# Patient Record
Sex: MALE
[De-identification: names, ages, dates, MRNs, and addresses within clinical notes are randomized per-mention and may not be internally consistent; named-entity substitution may affect disease eponyms.]

---

## 2017-12-26 ENCOUNTER — HOSPITAL ENCOUNTER (EMERGENCY)
Dept: HOSPITAL 14 - H.ER | Age: 37
Discharge: HOME | End: 2017-12-26
Payer: COMMERCIAL

## 2017-12-26 VITALS
DIASTOLIC BLOOD PRESSURE: 92 MMHG | OXYGEN SATURATION: 97 % | RESPIRATION RATE: 16 BRPM | HEART RATE: 83 BPM | TEMPERATURE: 99 F | SYSTOLIC BLOOD PRESSURE: 150 MMHG

## 2017-12-26 DIAGNOSIS — L03.211: Primary | ICD-10-CM

## 2017-12-26 NOTE — ED PDOC
HPI: General Adult


Time Seen by Provider: 12/26/17 07:57


History Per: Patient


Onset/Duration Of Symptoms: Days (2)


Current Symptoms Are (Timing): Still Present


Severity: Moderate


Pain Scale Rating Of: 1


Additional Complaint(s): 





Swelling lower lip since Saturday. No fever. No rash. No SOB. No tightness in 

throat.





Past Medical History





- Medical History


PMH: Asthma





- Family History


Family History: States: Unknown Family Hx





- Home Medications


Home Medications: 


 Ambulatory Orders











 Medication  Instructions  Recorded


 


Clindamycin [Cleocin] 300 mg PO TID #30 cap 12/26/17


 


Valacyclovir HCl [Valtrex] 1 gm PO Q8 #30 tablet 12/26/17


 


traMADol [Ultram] 50 mg PO Q8 #10 tab 12/26/17














- Allergies


Allergies/Adverse Reactions: 


 Allergies











Allergy/AdvReac Type Severity Reaction Status Date / Time


 


naproxen [From Naprosyn] AdvReac   Verified 12/26/17 08:09














Review of Systems


Constitutional: Negative for: Fever


ENT: Positive for: Mouth Pain, Mouth Swelling.  Negative for: Throat Swelling


Respiratory: Negative for: Shortness of Breath, Wheezing


Skin: Negative for: Rash





Physical Exam





- Physical Exam


Appears: Positive for: Non-toxic, No Acute Distress


Skin: Positive for: Normal Color, Warm.  Negative for: Rash


ENT: Positive for: Other (Mouth, lower lip swelling and induration. No 

fluctuance. No drainage. No submandibular swelling or tenderness. No trismus or 

stridor.).  Negative for: Pharyngeal Erythema, Tonsillar Exudate, Tonsillar 

Swelling





Disposition





- Clinical Impression


Clinical Impression: 


 Infection of lip








- Patient ED Disposition


Is Patient to be Admitted: No





- Disposition


Referrals: 


[facility] [Outside]


Behin,Babak, MD [Staff Provider] - 


Disposition: Routine/Home


Disposition Time: 08:13


Condition: FAIR


Prescriptions: 


Clindamycin [Cleocin] 300 mg PO TID #30 cap


traMADol [Ultram] 50 mg PO Q8 #10 tab


Valacyclovir HCl [Valtrex] 1 gm PO Q8 #30 tablet


Instructions:  Cellulitis (ED)

## 2017-12-28 ENCOUNTER — HOSPITAL ENCOUNTER (INPATIENT)
Dept: HOSPITAL 14 - H.ER | Age: 37
LOS: 8 days | Discharge: HOME | DRG: 137 | End: 2018-01-05
Attending: INTERNAL MEDICINE | Admitting: INTERNAL MEDICINE
Payer: COMMERCIAL

## 2017-12-28 DIAGNOSIS — B95.62: ICD-10-CM

## 2017-12-28 DIAGNOSIS — N17.0: ICD-10-CM

## 2017-12-28 DIAGNOSIS — E66.9: ICD-10-CM

## 2017-12-28 DIAGNOSIS — T36.8X5A: ICD-10-CM

## 2017-12-28 DIAGNOSIS — R74.0: ICD-10-CM

## 2017-12-28 DIAGNOSIS — R79.89: ICD-10-CM

## 2017-12-28 DIAGNOSIS — K52.1: ICD-10-CM

## 2017-12-28 DIAGNOSIS — K13.0: Primary | ICD-10-CM

## 2017-12-28 LAB
ALBUMIN SERPL-MCNC: 4.4 G/DL (ref 3.5–5)
ALBUMIN/GLOB SERPL: 1.1 {RATIO} (ref 1–2.1)
ALT SERPL-CCNC: 254 U/L (ref 21–72)
AST SERPL-CCNC: 147 U/L (ref 17–59)
BASE EXCESS BLDV CALC-SCNC: 5.9 MMOL/L (ref 0–2)
BASOPHILS # BLD AUTO: 0.1 K/UL (ref 0–0.2)
BASOPHILS NFR BLD: 0.6 % (ref 0–2)
BUN SERPL-MCNC: 6 MG/DL (ref 9–20)
CALCIUM SERPL-MCNC: 9.1 MG/DL (ref 8.4–10.2)
EOSINOPHIL # BLD AUTO: 0.2 K/UL (ref 0–0.7)
EOSINOPHIL NFR BLD: 1.3 % (ref 0–4)
ERYTHROCYTE [DISTWIDTH] IN BLOOD BY AUTOMATED COUNT: 13.8 % (ref 11.5–14.5)
GFR NON-AFRICAN AMERICAN: > 60
HGB BLD-MCNC: 14.5 G/DL (ref 12–18)
LYMPHOCYTES # BLD AUTO: 3.2 K/UL (ref 1–4.3)
LYMPHOCYTES NFR BLD AUTO: 18.8 % (ref 20–40)
MCH RBC QN AUTO: 26.5 PG (ref 27–31)
MCHC RBC AUTO-ENTMCNC: 32.9 G/DL (ref 33–37)
MCV RBC AUTO: 80.8 FL (ref 80–94)
MONOCYTES # BLD: 1.1 K/UL (ref 0–0.8)
MONOCYTES NFR BLD: 6.5 % (ref 0–10)
NEUTROPHILS # BLD: 12.3 K/UL (ref 1.8–7)
NEUTROPHILS NFR BLD AUTO: 72.8 % (ref 50–75)
NRBC BLD AUTO-RTO: 0 % (ref 0–0)
PCO2 BLDV: 54 MMHG (ref 40–60)
PH BLDV: 7.39 [PH] (ref 7.32–7.43)
PLATELET # BLD: 243 K/UL (ref 130–400)
PMV BLD AUTO: 9.6 FL (ref 7.2–11.7)
RBC # BLD AUTO: 5.47 MIL/UL (ref 4.4–5.9)
VENOUS BLOOD FIO2: 21 %
VENOUS BLOOD GAS PO2: 19 MM/HG (ref 30–55)
WBC # BLD AUTO: 16.9 K/UL (ref 4.8–10.8)

## 2017-12-28 RX ADMIN — VITAMIN A AND VITAMIN D PRN EA: 929.3 OINTMENT TOPICAL at 21:12

## 2017-12-28 RX ADMIN — WATER SCH MLS/HR: 1 INJECTION INTRAMUSCULAR; INTRAVENOUS; SUBCUTANEOUS at 21:07

## 2017-12-28 NOTE — ED PDOC
HPI: General Adult


Time Seen by Provider: 12/28/17 12:11


Chief Complaint (Nursing): Abnormal Skin Integrity


History Per: Patient


Additional Complaint(s): 





Pt. states on Saturday he "popped a pimple" on the bottom of his lower lip. On 

Tuesday he developed swelling to the lip and was seen in [facility] ED and prescribed 

Clindamycin which he started the same day which has not provided any relief. 

This morning swelling and pain became worse. Denies fever, trauma, hx of DM. 





Past Medical History


Reviewed: Historical Data, Nursing Documentation, Vital Signs


Vital Signs: 


 Last Vital Signs











Temp  98.7 F   12/28/17 15:54


 


Pulse  67   12/28/17 15:54


 


Resp  18   12/28/17 15:54


 


BP  169/97 H  12/28/17 15:54


 


Pulse Ox  98   12/28/17 18:59














- Family History


Family History: States: No Known Family Hx





- Home Medications


Home Medications: 


 Ambulatory Orders











 Medication  Instructions  Recorded


 


Valacyclovir HCl [Valtrex] 1 gm PO Q4 12/28/17


 


traMADol [Ultram] 50 mg PO Q6 PRN 12/28/17














- Allergies


Allergies/Adverse Reactions: 


 Allergies











Allergy/AdvReac Type Severity Reaction Status Date / Time


 


naproxen [From Naprosyn] AdvReac   Verified 12/26/17 08:09














Review of Systems


ROS Statement: Except As Marked, All Systems Reviewed And Found Negative





Physical Exam





- Physical Exam


Appears: Positive for: Well, Non-toxic, No Acute Distress


Skin: Positive for: Normal Color, Warm.  Negative for: Rash


Eye Exam: Positive for: Normal appearance


ENT: Positive for: Other (large swelling noted to entire lower lip greatest on 

R side with fluctuance )


Neck: Positive for: Normal, Painless ROM


Cardiovascular/Chest: Positive for: Regular Rate, Rhythm


Respiratory: Positive for: CNT, Normal Breath Sounds


Gastrointestinal/Abdominal: Positive for: Normal Exam, Soft.  Negative for: 

Tenderness


Extremity: Positive for: Normal ROM


Neurologic/Psych: Positive for: Alert, Oriented





- Laboratory Results


Result Diagrams: 


 12/28/17 13:54





 12/28/17 13:54





- ECG


O2 Sat by Pulse Oximetry: 98





- Progress


ED Course And Treament: 





Labs ordered. Zosyn IV, Vancomycin IV ordered. Blood culture x 2 ordered. 


Pt. evaluated by Dr. Chung in ED.


Case d/w Dr. Lewis, plastic surgeon, who see patient in hospital.


Arrangements made for 23 hr observation with Dr. Teixeira. 





Disposition





- Clinical Impression


Clinical Impression: 


 Abscess of lip








- Patient ED Disposition


Is Patient to be Admitted: Yes





- Disposition


Disposition Time: 12:44


Condition: STABLE

## 2017-12-29 LAB
ALBUMIN SERPL-MCNC: 4.2 G/DL (ref 3.5–5)
ALBUMIN/GLOB SERPL: 1.1 {RATIO} (ref 1–2.1)
ALT SERPL-CCNC: 238 U/L (ref 21–72)
APTT BLD: 35.2 SECONDS (ref 25.6–37.1)
AST SERPL-CCNC: 119 U/L (ref 17–59)
BUN SERPL-MCNC: 7 MG/DL (ref 9–20)
CALCIUM SERPL-MCNC: 9.1 MG/DL (ref 8.4–10.2)
ERYTHROCYTE [DISTWIDTH] IN BLOOD BY AUTOMATED COUNT: 13.8 % (ref 11.5–14.5)
GFR NON-AFRICAN AMERICAN: > 60
HEPATITIS A IGM: NEGATIVE
HEPATITIS B SURFACE AG: NEGATIVE
HGB BLD-MCNC: 14.4 G/DL (ref 12–18)
INR PPP: 1.3 (ref 0.9–1.2)
MCH RBC QN AUTO: 26.5 PG (ref 27–31)
MCHC RBC AUTO-ENTMCNC: 32.6 G/DL (ref 33–37)
MCV RBC AUTO: 81.2 FL (ref 80–94)
PLATELET # BLD: 238 K/UL (ref 130–400)
PROTHROMBIN TIME: 14.1 SECONDS (ref 9.8–13.1)
RBC # BLD AUTO: 5.44 MIL/UL (ref 4.4–5.9)
WBC # BLD AUTO: 14.3 K/UL (ref 4.8–10.8)

## 2017-12-29 PROCEDURE — 0C91XZZ DRAINAGE OF LOWER LIP, EXTERNAL APPROACH: ICD-10-PCS | Performed by: SURGERY

## 2017-12-29 RX ADMIN — ENOXAPARIN SODIUM SCH: 40 INJECTION SUBCUTANEOUS at 18:32

## 2017-12-29 RX ADMIN — WATER SCH MLS/HR: 1 INJECTION INTRAMUSCULAR; INTRAVENOUS; SUBCUTANEOUS at 22:50

## 2017-12-29 RX ADMIN — WATER SCH MLS/HR: 1 INJECTION INTRAMUSCULAR; INTRAVENOUS; SUBCUTANEOUS at 18:29

## 2017-12-29 RX ADMIN — VITAMIN A AND VITAMIN D PRN EA: 929.3 OINTMENT TOPICAL at 09:16

## 2017-12-29 RX ADMIN — WATER SCH MLS/HR: 1 INJECTION INTRAMUSCULAR; INTRAVENOUS; SUBCUTANEOUS at 04:11

## 2017-12-29 RX ADMIN — WATER SCH MLS/HR: 1 INJECTION INTRAMUSCULAR; INTRAVENOUS; SUBCUTANEOUS at 09:20

## 2017-12-29 NOTE — CP.PCM.CON
History of Present Illness





- History of Present Illness


History of Present Illness: 





"popped a pimple" on the bottom of his lower lip. On Tuesday he developed 

swelling to the lip and was seen in [facility] ED and prescribed Clindamycin which he 

started the same day which has not provided any relief. This morning swelling 

and pain became worse. Denies fever, trauma, hx of DM. 





swelling worse despite  iv antibiotics








- Home Medications


Home Medications: 


 Ambulatory Orders











 Medication  Instructions  Recorded


 


Valacyclovir HCl [Valtrex] 1 gm PO Q4 12/28/17


 


traMADol [Ultram] 50 mg PO Q6 PRN 12/28/17

















Review of Systems





- Constitutional


Constitutional: Anorexia, Chills, Fever, Malaise





- EENT


Eyes: absent: As Per HPI, Blind Spots, Blurred Vision, Change in Vision, 

Decreased Night Vision, Diplopia, Discharge, Dry Eye, Exophthalmos, Floaters, 

Irritation, Itchy Eyes, Loss of Peripheral Vision, Pain, Photophobia, Requires 

Corrective Lenses, Sees Flashes, Spots in Vision, Tunnel Vision, Other Visual 

Disturbances, Loss of Vision, Other


Ears: absent: As Per HPI, Decreased Hearing, Ear Discharge, Ear Pain, Tinnitus, 

Abnormal Hearing, Disequilibrium, Dizziness, Other


Nose/Mouth/Throat: As Per HPI





- Cardiovascular


Cardiovascular: absent: As Per HPI, Acrocyanosis, Chest Pain, Chest Pain at Rest

, Chest Pain with Activity, Claudication, Diaphoresis, Dyspnea, Dyspnea on 

Exertion, Edema, Irregular Heart Rhythm, Pain Radiating to Arm/Neck/Jaw, Leg 

Edema, Leg Ulcers, Lightheadedness, Orthopnea, Palpitations, Paroxysmal 

Nocturnal Dyspnea, Pedal Edema, Radiating Pain, Rapid Heart Rate, Slow Heart 

Rate, Syncope, Other





- Respiratory


Respiratory: absent: As Per HPI, Cough, Dyspnea, Hemoptysis, Dyspnea on Exertion

, Wheezing, Snoring, Stridor, Pain on Inspiration, Chest Congestion, Excessive 

Mucous Production, Change in Mucous Color, Pain with Coughing, Other





- Gastrointestinal


Gastrointestinal: absent: As Per HPI, Abdominal Pain, Belching, Bloating, 

Change in Bowel Habits, Change in Stool Character, Coffee Ground Emesis, 

Constipation, Cramping, Diarrhea, Dyspepsia, Dysphagia, Early Satiety, 

Excessive Flatus, Fecal Incontinence, Heartburn, Hematemesis, Hematochezia, 

Loose Stools, Melena, Nausea, Odynophagia, Temesmus, Vomiting, Other





- Genitourinary


Genitourinary: absent: As Per HPI, Change in Urinary Stream, Difficulty 

Urinating, Dysuria, Flank Pain, Hematuria, Pyuria, Nocturia, Urinary 

Incontinence, Urinary Frequency, Urinary Hesitance, Urinary Urgency, Voiding 

Freq/Small Amts, Freq UTI, Hx Renal/Bladder Calculi, Hx /Renal Surgery, 

Bladder Distension, Other





- Musculoskeletal


Musculoskeletal: absent: As Per HPI, Abnormal Gait, Arthralgias, Atrophy, Back 

Pain, Deformity, Joint Swelling, Limited Range of Motion, Loss of Height, 

Muscle Cramps, Muscle Weakness, Myalgias, Neck Pain, Numbness, Radiating Pain 

into Limb, Stiffness, Tingling, Other





- Integumentary


Integumentary: absent: As Per HPI, Acne, Alopecia, Bleeding Lesions, Change in 

Hair, Change in Nails, Change in Pigmentation, Changing Lesions, Dry Skin, 

Erythema, Furuncle, Hirsutism, Lesions, New Lesions, Non-Healing Lesions, 

Photosensitivity, Pruritus, Rash, Skin Pain, Skin Ulcer, Sores, Striae, Swelling

, Unusual Bruising, Wounds, Jaundice, Other





- Neurological


Neurological: absent: As Per HPI, Abnormal Gait, Abnormal Hearing, Abnormal 

Movements, Abnormal Speech, Behavioral Changes, Burning Sensations, Confusion, 

Convulsions, Disequilibrium, Dizziness, Numbness, Focal Weakness, Frequent Falls

, Headaches, Lack of Coordination, Loss of Vision, Memory Loss, Paresthesias, 

Radicular Pain, Restless Legs, Sensory Deficit, Syncope, Tingling, Tremor, 

Vertigo, Weakness, Other Visual Disturbances, Other





- Psychiatric


Psychiatric: absent: As Per HPI, Abnormal Sleep Pattern, Anhedonia, Anxiety, 

Auditory Hallucinations, Behavioral Changes, Change in Appetite, Change in 

Libido, Confusion, Depression, Difficulty Concentrating, Hallucinations, 

Homicidal Ideation, Hopelessness, Irritability, Memory Loss, Mood Swings, Panic 

Attacks, Paranoia, Suicidal Ideation, Visual Hallucinations, Tactile 

Hallucinations, Other





- Endocrine


Endocrine: absent: As Per HPI, Change in Body Appearance, Change in Libido, 

Cold Intolorance, Deepening of Voice, Excessive Sweating, Fatigue, Flushing, 

Heat Intolorance, Increase in Ring/Shoe/Hat Size, Palpitations, Polydipsia, 

Polyphagia, Polyuria, Other





- Hematologic/Lymphatic


Hematologic: absent: As Per HPI, Easy Bleeding, Easy Bruising, Lymphadenopathy, 

Other





Past Patient History





- Past Social History


Smoking Status: Never Smoked





- CARDIAC


Hx Cardiac Disorders: No





- PULMONARY


Hx Respiratory Disorders: No





- NEUROLOGICAL


Hx Neurological Disorder: No





- HEENT


Hx HEENT Problems: No





- RENAL


Hx Chronic Kidney Disease: No





- ENDOCRINE/METABOLIC


Hx Endocrine Disorders: No





- HEMATOLOGICAL/ONCOLOGICAL


Hx Blood Disorders: No





- INTEGUMENTARY


Hx Dermatological Problems: No





- MUSCULOSKELETAL/RHEUMATOLOGICAL


Hx Musculoskeletal Disorders: No


Hx Falls: No





- GENITOURINARY/GYNECOLOGICAL


Hx Genitourinary Disorders: No





- PSYCHIATRIC


Hx Psychophysiologic Disorder: No


Hx Substance Use: No





- SURGICAL HISTORY


Hx Surgeries: No





- ANESTHESIA


Hx Anesthesia: No





Meds


Allergies/Adverse Reactions: 


 Allergies











Allergy/AdvReac Type Severity Reaction Status Date / Time


 


naproxen [From Naprosyn] AdvReac   Verified 12/26/17 08:09














- Medications


Medications: 


 Current Medications





Acetaminophen (Tylenol 325mg Tab)  650 mg PO Q6 PRN


   PRN Reason: Pain, moderate (4-7)


Acetaminophen (Tylenol 325mg Tab)  650 mg PO Q6 PRN


   PRN Reason: Headache


Acyclovir (Zovirax)  800 mg PO 5XD ANGELA


   PRN Reason: Protocol


   Last Admin: 12/29/17 13:02 Dose:  800 mg


Piperacillin Sod/Tazobactam (Sod 3.375 gm/ Sodium Chloride)  100 mls @ 100 mls/

hr IVPB Q6 ANGELA


   PRN Reason: Protocol


   Last Admin: 12/29/17 09:20 Dose:  100 mls/hr


Vancomycin HCl 1,500 mg/ (Sodium Chloride)  500 mls @ 250 mls/hr IVPB Q12 ANGELA


   PRN Reason: Protocol


   Last Admin: 12/29/17 09:50 Dose:  250 mls/hr


Tramadol HCl (Ultram)  50 mg PO Q6 PRN


   PRN Reason: Pain, severe (8-10)


   Last Admin: 12/29/17 03:14 Dose:  50 mg


Vitamin A (Vitamin A & D Oint Ud Foilpak)  1 ea TOP PRN PRN


   PRN Reason: Dry mouth


   Last Admin: 12/29/17 09:16 Dose:  1 ea











Physical Exam





- Constitutional


Appears: Non-toxic, Chronically Ill





- Head Exam


Head Exam: NORMOCEPHALIC





- Eye Exam


Eye Exam: PERRL.  absent: Scleral icterus





- ENT Exam


ENT Exam: Mucous Membranes Dry, Normal External Ear Exam.  absent: Mucous 

Membranes Moist, Normal Exam, Normal Oropharynx


Additional comments: 





massive swelling lower lip   with wound from I and D - min drainage 





- Neck Exam


Neck exam: Negative for: Lymphadenopathy





- Respiratory Exam


Respiratory Exam: Decreased Breath Sounds





- Cardiovascular Exam


Cardiovascular Exam: REGULAR RHYTHM





- GI/Abdominal Exam


GI & Abdominal Exam: Diminished Bowel Sounds





- Rectal Exam


Rectal Exam: Deferred





-  Exam


 Exam: NORMAL INSPECTION





- Extremities Exam


Extremities exam: Negative for: pedal edema





- Back Exam


Back exam: absent: CVA tenderness (L), CVA tenderness (R)





- Neurological Exam


Neurological exam: Alert, CN II-XII Intact, Oriented x3, Reflexes Normal





- Psychiatric Exam


Psychiatric exam: Normal Mood





- Skin


Skin Exam: Dry





Results





- Vital Signs


Recent Vital Signs: 


 Last Vital Signs











Temp  98.2 F   12/29/17 07:37


 


Pulse  81   12/29/17 07:37


 


Resp  20   12/29/17 07:37


 


BP  137/82   12/29/17 07:37


 


Pulse Ox  97   12/29/17 07:37














- Labs


Result Diagrams: 


 12/29/17 06:10





 12/29/17 06:10


Labs: 


 Laboratory Results - last 24 hr











  12/28/17 12/28/17 12/28/17





  12:37 13:54 13:54


 


WBC   16.9 H 


 


RBC   5.47 


 


Hgb   14.5 


 


Hct   44.1 


 


MCV   80.8 


 


MCH   26.5 L 


 


MCHC   32.9 L 


 


RDW   13.8 


 


Plt Count   243 


 


MPV   9.6 


 


Neut % (Auto)   72.8 


 


Lymph % (Auto)   18.8 L 


 


Mono % (Auto)   6.5 


 


Eos % (Auto)   1.3 


 


Baso % (Auto)   0.6 


 


Neut #   12.3 H 


 


Lymph #   3.2 


 


Mono #   1.1 H 


 


Eos #   0.2 


 


Baso #   0.1 


 


pO2  19 L  


 


VBG pH  7.39  


 


VBG pCO2  54  


 


VBG HCO3  27.6  


 


VBG Total CO2  34.4 H  


 


VBG O2 Sat (Calc)  37.4 L  


 


VBG Base Excess  5.9 H  


 


VBG Potassium  4.4  


 


A-a O2 Difference  63.0  


 


Sodium  136.0   139


 


Chloride  102.0   98


 


Glucose  103  


 


Lactate  1.1  


 


FiO2  21.0  


 


Crit Value Called To  Susanne marshall  


 


Crit Value Called By  15  


 


Crit Value Read Back  Y  


 


Blood Gas Notified Time  1327  


 


Potassium    4.3


 


Carbon Dioxide    32 H


 


Anion Gap    13


 


BUN    6 L


 


Creatinine    0.7 L


 


Est GFR ( Amer)    > 60


 


Est GFR (Non-Af Amer)    > 60


 


Random Glucose    98


 


Calcium    9.1


 


Total Bilirubin    0.8


 


AST    147 H


 


ALT    254 H


 


Alkaline Phosphatase    183 H


 


Total Protein    8.4 H


 


Albumin    4.4


 


Globulin    4.0 H


 


Albumin/Globulin Ratio    1.1


 


Venous Blood Potassium  4.4  














  12/29/17 12/29/17





  06:10 06:10


 


WBC  14.3 H 


 


RBC  5.44 


 


Hgb  14.4 


 


Hct  44.2 


 


MCV  81.2 


 


MCH  26.5 L 


 


MCHC  32.6 L 


 


RDW  13.8 


 


Plt Count  238 


 


MPV  


 


Neut % (Auto)  


 


Lymph % (Auto)  


 


Mono % (Auto)  


 


Eos % (Auto)  


 


Baso % (Auto)  


 


Neut #  


 


Lymph #  


 


Mono #  


 


Eos #  


 


Baso #  


 


pO2  


 


VBG pH  


 


VBG pCO2  


 


VBG HCO3  


 


VBG Total CO2  


 


VBG O2 Sat (Calc)  


 


VBG Base Excess  


 


VBG Potassium  


 


A-a O2 Difference  


 


Sodium   137


 


Chloride   98


 


Glucose  


 


Lactate  


 


FiO2  


 


Crit Value Called To  


 


Crit Value Called By  


 


Crit Value Read Back  


 


Blood Gas Notified Time  


 


Potassium   4.9


 


Carbon Dioxide   31 H


 


Anion Gap   13


 


BUN   7 L


 


Creatinine   0.8


 


Est GFR ( Amer)   > 60


 


Est GFR (Non-Af Amer)   > 60


 


Random Glucose   108


 


Calcium   9.1


 


Total Bilirubin   1.1


 


AST   119 H


 


ALT   238 H


 


Alkaline Phosphatase   162 H


 


Total Protein   8.2


 


Albumin   4.2


 


Globulin   4.0 H


 


Albumin/Globulin Ratio   1.1


 


Venous Blood Potassium  














Assessment & Plan


(1) Abscess of lip


Status: Acute   





(2) Infection of lip


Status: Acute   





- Assessment and Plan (Free Text)


Assessment: 





elevated LFT's  etio unclear 


will screen


cont iv antibiotics 


await cultures

## 2017-12-29 NOTE — US
HISTORY:

Transaminitis. 



COMPARISON:

No prior



TECHNIQUE:

Sonographic evaluation of the abdomen. . Note that the examination is 

limited due to body habitus and bowel gas 



FINDINGS:



LIVER:

Liver exhibits normal size measuring nearly 17 cm in CC dimension.  

Liver demonstrates smooth contour and normal echogenicity of the 

liver. . No mass. No intrahepatic bile duct dilatation. Portal vein 

demonstrates hepatopetal flow. No ascites. 



GALLBLADDER:

Cholelithiasis



COMMON BILE DUCT:

Measures 3 mm. No stones. No dilatation.



PANCREAS:

Pancreas is poorly delineated.



RIGHT KIDNEY:

Right kidney measures approximately 11.1 x 4.9 x 4.0cm. Normal 

echogenicity. No calculus, mass, or hydronephrosis.



LEFT KIDNEY:

Left kidney measures approximately 11.2 x 5.9 x 4.4 Normal 

echogenicity. No calculus, mass, or hydronephrosis.



SPLEEN:

Normal in size and contour. No mass.



AORTA:

No aneurysmal dilatation. 



IVC:

Unremarkable. 



OTHER FINDINGS:

None. 



IMPRESSION:

Cholelithiasis.

## 2017-12-29 NOTE — CP.PCM.HP
<Edwin Echeverria - Last Filed: 12/29/17 14:40>





History of Present Illness





- History of Present Illness


History of Present Illness: 





38 yo ,m, no significant PMhx presents to ED with complaint of inferior lip 

abscess and swelling. Patient reports that 6 days ago he pressed a pimple over 

his inferior lip and 3 days after it got swollen and painful. patient came to 

ED and was evaluated and prescribed clindamycin but 2 days after treatment it 

got worse and more swollen associated with chills, subjective fever. He denies 

headache, dizziness, hx/o DM, hx/o skin infections in the past, trauma. 





Patient seen and examined bedside with Dr valadez. Patient s/o I & D yesterday by 

Plastic surgery. Patient tolerating liquid diet and reports minimal pain 

alleviated with pain medications. 








PMhx: None


Allergies: Naproxen. "cough" reaction


Meds: None


PSurgHx: none


PShx: Denies ETOH,rect drugs, cig








Present on Admission





- Present on Admission


Any Indicators Present on Admission: No


History of DVT/PE: No


History of Uncontrolled Diabetes: No


Urinary Catheter: No


Decubitus Ulcer Present: No





Review of Systems





- Constitutional


Constitutional: As Per HPI





- EENT


Nose/Mouth/Throat: Lip Swelling, Mouth Lesions, Mouth Pain.  absent: Facial Pain

, Neck Pain





- Gastrointestinal


Gastrointestinal: As Per HPI





- Genitourinary


Genitourinary: As Per HPI





- Musculoskeletal


Musculoskeletal: As Per HPI





Past Patient History





- Past Social History


Smoking Status: Never Smoked





- CARDIAC


Hx Cardiac Disorders: No





- PULMONARY


Hx Respiratory Disorders: No





- NEUROLOGICAL


Hx Neurological Disorder: No





- HEENT


Hx HEENT Problems: No





- RENAL


Hx Chronic Kidney Disease: No





- ENDOCRINE/METABOLIC


Hx Endocrine Disorders: No





- HEMATOLOGICAL/ONCOLOGICAL


Hx Blood Disorders: No





- INTEGUMENTARY


Hx Dermatological Problems: No





- MUSCULOSKELETAL/RHEUMATOLOGICAL


Hx Musculoskeletal Disorders: No


Hx Falls: No





- GENITOURINARY/GYNECOLOGICAL


Hx Genitourinary Disorders: No





- PSYCHIATRIC


Hx Psychophysiologic Disorder: No


Hx Substance Use: No





- SURGICAL HISTORY


Hx Surgeries: No





- ANESTHESIA


Hx Anesthesia: No





Meds


Allergies/Adverse Reactions: 


 Allergies











Allergy/AdvReac Type Severity Reaction Status Date / Time


 


naproxen [From Naprosyn] AdvReac   Verified 12/26/17 08:09














Physical Exam





- Constitutional


Appears: No Acute Distress





- Head Exam


Head Exam: ATRAUMATIC, NORMOCEPHALIC





- Eye Exam


Eye Exam: Normal appearance





- ENT Exam


ENT Exam: Mucous Membranes Moist


Additional comments: 





inferior lip swollen, erythema, s/p I & D yesterday, horizontal scab linear 4 

cm size, no active bleeding, no discharge.  





- Expanded ENT Exam


  ** Expanded


Mouth exam: absent: drooling, muffled voice, tongue elevation, tongue 

hypertrophy, tongue normal, trismus


Throat exam: Normal Inspection.  absent: Tonsillar Erythema, Tonsillar Exudate





- Neck Exam


Neck exam: Positive for: Normal Inspection





- Respiratory Exam


Respiratory Exam: Clear to Auscultation Bilateral.  absent: Rhonchi, Wheezes





- Cardiovascular Exam


Cardiovascular Exam: REGULAR RHYTHM, +S1, +S2





- GI/Abdominal Exam


GI & Abdominal Exam: Normal Bowel Sounds, Soft.  absent: Guarding, Rebound, 

Tenderness





- Extremities Exam


Extremities exam: Positive for: normal inspection.  Negative for: pedal edema, 

tenderness





- Neurological Exam


Neurological exam: Alert, Oriented x3





- Psychiatric Exam


Psychiatric exam: Normal Affect, Normal Mood





- Skin


Skin Exam: Erythema


Additional comments: 





inferior lip





Results





- Vital Signs


Recent Vital Signs: 





 Last Vital Signs











Temp  98.2 F   12/29/17 07:37


 


Pulse  81   12/29/17 07:37


 


Resp  20   12/29/17 07:37


 


BP  137/82   12/29/17 07:37


 


Pulse Ox  97   12/29/17 07:37














- Labs


Result Diagrams: 


 12/29/17 06:10





 12/29/17 06:10


Labs: 





 Laboratory Results - last 24 hr











  12/28/17 12/28/17 12/28/17





  12:37 13:54 13:54


 


WBC   16.9 H 


 


RBC   5.47 


 


Hgb   14.5 


 


Hct   44.1 


 


MCV   80.8 


 


MCH   26.5 L 


 


MCHC   32.9 L 


 


RDW   13.8 


 


Plt Count   243 


 


MPV   9.6 


 


Neut % (Auto)   72.8 


 


Lymph % (Auto)   18.8 L 


 


Mono % (Auto)   6.5 


 


Eos % (Auto)   1.3 


 


Baso % (Auto)   0.6 


 


Neut #   12.3 H 


 


Lymph #   3.2 


 


Mono #   1.1 H 


 


Eos #   0.2 


 


Baso #   0.1 


 


pO2  19 L  


 


VBG pH  7.39  


 


VBG pCO2  54  


 


VBG HCO3  27.6  


 


VBG Total CO2  34.4 H  


 


VBG O2 Sat (Calc)  37.4 L  


 


VBG Base Excess  5.9 H  


 


VBG Potassium  4.4  


 


A-a O2 Difference  63.0  


 


Sodium  136.0   139


 


Chloride  102.0   98


 


Glucose  103  


 


Lactate  1.1  


 


FiO2  21.0  


 


Crit Value Called To  Susanne marshall  


 


Crit Value Called By  15  


 


Crit Value Read Back  Y  


 


Blood Gas Notified Time  1327  


 


Potassium    4.3


 


Carbon Dioxide    32 H


 


Anion Gap    13


 


BUN    6 L


 


Creatinine    0.7 L


 


Est GFR ( Amer)    > 60


 


Est GFR (Non-Af Amer)    > 60


 


Random Glucose    98


 


Calcium    9.1


 


Total Bilirubin    0.8


 


AST    147 H


 


ALT    254 H


 


Alkaline Phosphatase    183 H


 


Total Protein    8.4 H


 


Albumin    4.4


 


Globulin    4.0 H


 


Albumin/Globulin Ratio    1.1


 


Venous Blood Potassium  4.4  














  12/29/17 12/29/17





  06:10 06:10


 


WBC  14.3 H 


 


RBC  5.44 


 


Hgb  14.4 


 


Hct  44.2 


 


MCV  81.2 


 


MCH  26.5 L 


 


MCHC  32.6 L 


 


RDW  13.8 


 


Plt Count  238 


 


MPV  


 


Neut % (Auto)  


 


Lymph % (Auto)  


 


Mono % (Auto)  


 


Eos % (Auto)  


 


Baso % (Auto)  


 


Neut #  


 


Lymph #  


 


Mono #  


 


Eos #  


 


Baso #  


 


pO2  


 


VBG pH  


 


VBG pCO2  


 


VBG HCO3  


 


VBG Total CO2  


 


VBG O2 Sat (Calc)  


 


VBG Base Excess  


 


VBG Potassium  


 


A-a O2 Difference  


 


Sodium   137


 


Chloride   98


 


Glucose  


 


Lactate  


 


FiO2  


 


Crit Value Called To  


 


Crit Value Called By  


 


Crit Value Read Back  


 


Blood Gas Notified Time  


 


Potassium   4.9


 


Carbon Dioxide   31 H


 


Anion Gap   13


 


BUN   7 L


 


Creatinine   0.8


 


Est GFR ( Amer)   > 60


 


Est GFR (Non-Af Amer)   > 60


 


Random Glucose   108


 


Calcium   9.1


 


Total Bilirubin   1.1


 


AST   119 H


 


ALT   238 H


 


Alkaline Phosphatase   162 H


 


Total Protein   8.2


 


Albumin   4.2


 


Globulin   4.0 H


 


Albumin/Globulin Ratio   1.1


 


Venous Blood Potassium  














Assessment & Plan





- Assessment and Plan (Free Text)


Plan: 





Assessment/Plan





1) Abscess of lip


-s/p I & D yesterday 


-vanco/zosyn


-Plastic surgery consult.Dr. Lewis, plastic surgeon consult appreciated


-ID consult suggested. 





2) Transaminitis


-Abd Us


-Lipid profile, Hgba1c


-Hepatic panel


-HIV test





3) DVT Prophylaxis


-lovenox 40 mg sc 








<Vipul Valadez K - Last Filed: 01/09/18 12:23>





Results





- Vital Signs


Recent Vital Signs: 





 Last Vital Signs











Temp  97.5 F L  01/05/18 08:25


 


Pulse  77   01/05/18 08:25


 


Resp  18   01/05/18 08:25


 


BP  129/81   01/05/18 08:25


 


Pulse Ox  96   01/05/18 08:25














- Labs


Result Diagrams: 


 01/05/18 05:10





 01/05/18 11:45





Assessment & Plan





- Assessment and Plan (Free Text)


Assessment: 


Patient was personally seen and examined by me in rounds with residents.


Available labs and diagnostic data reviewed.


Case, Patient's condition and management plan discussed with residents in 

rounds.


Agree with resident's progress note.


Plan: As ordered.

## 2017-12-30 LAB
ALBUMIN SERPL-MCNC: 3.9 G/DL (ref 3.5–5)
ALBUMIN/GLOB SERPL: 1 {RATIO} (ref 1–2.1)
ALT SERPL-CCNC: 163 U/L (ref 21–72)
AST SERPL-CCNC: 58 U/L (ref 17–59)
BASOPHILS # BLD AUTO: 0.1 K/UL (ref 0–0.2)
BASOPHILS NFR BLD: 0.4 % (ref 0–2)
BUN SERPL-MCNC: 9 MG/DL (ref 9–20)
CALCIUM SERPL-MCNC: 9.3 MG/DL (ref 8.4–10.2)
EOSINOPHIL # BLD AUTO: 0.3 K/UL (ref 0–0.7)
EOSINOPHIL NFR BLD: 2 % (ref 0–4)
ERYTHROCYTE [DISTWIDTH] IN BLOOD BY AUTOMATED COUNT: 14.1 % (ref 11.5–14.5)
GFR NON-AFRICAN AMERICAN: > 60
HDLC SERPL-MCNC: 31 MG/DL (ref 30–70)
HGB BLD-MCNC: 13.9 G/DL (ref 12–18)
LDLC SERPL-MCNC: 99 MG/DL (ref 0–129)
LYMPHOCYTES # BLD AUTO: 2.3 K/UL (ref 1–4.3)
LYMPHOCYTES NFR BLD AUTO: 17.4 % (ref 20–40)
MCH RBC QN AUTO: 26.7 PG (ref 27–31)
MCHC RBC AUTO-ENTMCNC: 33 G/DL (ref 33–37)
MCV RBC AUTO: 80.8 FL (ref 80–94)
MONOCYTES # BLD: 1.1 K/UL (ref 0–0.8)
MONOCYTES NFR BLD: 8.6 % (ref 0–10)
NEUTROPHILS # BLD: 9.4 K/UL (ref 1.8–7)
NEUTROPHILS NFR BLD AUTO: 71.6 % (ref 50–75)
NRBC BLD AUTO-RTO: 0.1 % (ref 0–0)
PLATELET # BLD: 254 K/UL (ref 130–400)
PMV BLD AUTO: 9.3 FL (ref 7.2–11.7)
RBC # BLD AUTO: 5.21 MIL/UL (ref 4.4–5.9)
WBC # BLD AUTO: 13.1 K/UL (ref 4.8–10.8)

## 2017-12-30 RX ADMIN — WATER SCH MLS/HR: 1 INJECTION INTRAMUSCULAR; INTRAVENOUS; SUBCUTANEOUS at 05:16

## 2017-12-30 RX ADMIN — ENOXAPARIN SODIUM SCH MG: 40 INJECTION SUBCUTANEOUS at 08:34

## 2017-12-30 RX ADMIN — ENOXAPARIN SODIUM SCH: 40 INJECTION SUBCUTANEOUS at 08:39

## 2017-12-30 RX ADMIN — WATER SCH MLS/HR: 1 INJECTION INTRAMUSCULAR; INTRAVENOUS; SUBCUTANEOUS at 17:06

## 2017-12-30 RX ADMIN — WATER SCH MLS/HR: 1 INJECTION INTRAMUSCULAR; INTRAVENOUS; SUBCUTANEOUS at 11:52

## 2017-12-30 NOTE — PN
DATE:  12/30/2017



SUBJECTIVE:  Patient is seen and examined.  Interim events noted.  Consults

noted and appreciated.  Infectious Disease followup and intervention noted

and appreciated.  Patient remains in regular floor.  Patient feels a little

better.  Still has discomfort on the lips and difficulty in eating, but he

is able to tolerate liquid diet, also feels discomfort of plaquing, but

overall pain is improving.  Also patient feels that swelling is also going

down.



PHYSICAL EXAMINATION:

GENERAL:  Patient is in no acute distress.

VITAL SIGNS:  Stable.

HEART:  S1 and S2, normal and regular.

LUNGS:  Good bilateral air exchange.

ABDOMEN:  Soft, nontender.

EXTREMITIES:  No edema.  No calf swelling.  No tenderness.  No acute

ischemia.

CENTRAL NERVOUS SYSTEM:  Essentially unchanged.

FACIAL EXAM:  Lower lip still stays significantly swollen with plaquing on

that side, does look a little less swollen and seems to be improving.



DIAGNOSTIC DATA:  Available diagnostic data reviewed.  WBC count still

remains elevated at 13.



ASSESSMENT AND PLAN:  Patient does have some diarrhea, which could be

antibiotic-associated diarrhea.  Plan as ordered.  Case and plan discussed

with patient.





__________________________________________

Vipul Teixeira MD



DD:  12/30/2017 8:31:36

DT:  12/30/2017 9:15:05

Job # [id number]

## 2017-12-31 LAB
ALBUMIN SERPL-MCNC: 3.8 G/DL (ref 3.5–5)
ALBUMIN/GLOB SERPL: 1 {RATIO} (ref 1–2.1)
ALT SERPL-CCNC: 119 U/L (ref 21–72)
AST SERPL-CCNC: 38 U/L (ref 17–59)
BASOPHILS # BLD AUTO: 0 K/UL (ref 0–0.2)
BASOPHILS NFR BLD: 0.4 % (ref 0–2)
BUN SERPL-MCNC: 12 MG/DL (ref 9–20)
CALCIUM SERPL-MCNC: 9.3 MG/DL (ref 8.4–10.2)
EOSINOPHIL # BLD AUTO: 0.2 K/UL (ref 0–0.7)
EOSINOPHIL NFR BLD: 1.9 % (ref 0–4)
ERYTHROCYTE [DISTWIDTH] IN BLOOD BY AUTOMATED COUNT: 13.6 % (ref 11.5–14.5)
ERYTHROCYTE [DISTWIDTH] IN BLOOD BY AUTOMATED COUNT: 13.8 % (ref 11.5–14.5)
GFR NON-AFRICAN AMERICAN: > 60
HGB BLD-MCNC: 13.1 G/DL (ref 12–18)
HGB BLD-MCNC: 13.7 G/DL (ref 12–18)
LYMPHOCYTES # BLD AUTO: 2 K/UL (ref 1–4.3)
LYMPHOCYTES NFR BLD AUTO: 16.8 % (ref 20–40)
MCH RBC QN AUTO: 26.5 PG (ref 27–31)
MCH RBC QN AUTO: 26.8 PG (ref 27–31)
MCHC RBC AUTO-ENTMCNC: 32.7 G/DL (ref 33–37)
MCHC RBC AUTO-ENTMCNC: 33.1 G/DL (ref 33–37)
MCV RBC AUTO: 81.1 FL (ref 80–94)
MCV RBC AUTO: 81.1 FL (ref 80–94)
MONOCYTES # BLD: 1.1 K/UL (ref 0–0.8)
MONOCYTES NFR BLD: 9.4 % (ref 0–10)
NEUTROPHILS # BLD: 8.7 K/UL (ref 1.8–7)
NEUTROPHILS NFR BLD AUTO: 71.5 % (ref 50–75)
NRBC BLD AUTO-RTO: 0.1 % (ref 0–0)
PLATELET # BLD: 236 K/UL (ref 130–400)
PLATELET # BLD: 247 K/UL (ref 130–400)
PMV BLD AUTO: 8.6 FL (ref 7.2–11.7)
RBC # BLD AUTO: 4.94 MIL/UL (ref 4.4–5.9)
RBC # BLD AUTO: 5.09 MIL/UL (ref 4.4–5.9)
WBC # BLD AUTO: 12.1 K/UL (ref 4.8–10.8)
WBC # BLD AUTO: 15.3 K/UL (ref 4.8–10.8)

## 2017-12-31 RX ADMIN — WATER SCH MLS/HR: 1 INJECTION INTRAMUSCULAR; INTRAVENOUS; SUBCUTANEOUS at 11:51

## 2017-12-31 RX ADMIN — ENOXAPARIN SODIUM SCH: 40 INJECTION SUBCUTANEOUS at 09:45

## 2017-12-31 RX ADMIN — ENOXAPARIN SODIUM SCH MG: 40 INJECTION SUBCUTANEOUS at 09:38

## 2017-12-31 RX ADMIN — Medication SCH CAP: at 17:01

## 2017-12-31 RX ADMIN — WATER SCH MLS/HR: 1 INJECTION INTRAMUSCULAR; INTRAVENOUS; SUBCUTANEOUS at 06:23

## 2017-12-31 RX ADMIN — WATER SCH MLS/HR: 1 INJECTION INTRAMUSCULAR; INTRAVENOUS; SUBCUTANEOUS at 00:46

## 2017-12-31 RX ADMIN — WATER SCH MLS/HR: 1 INJECTION INTRAMUSCULAR; INTRAVENOUS; SUBCUTANEOUS at 17:04

## 2017-12-31 NOTE — PN
DATE:  12/31/2017



SUBJECTIVE:  The patient is seen and examined.  Interim events noted.  The

patient remains in regular medical floor with IV antibiotic.  Feels a

little better.  Pain in the lip is improved.  Swelling also seems to be

improved.  No new complaint of chest pain or shortness of breath or any

side effect from medication.



PHYSICAL EXAMINATION:

GENERAL:  The patient is in no acute distress.

VITAL SIGNS:  Stable.

HEART:  S1 and S2, normal and regular.

LUNGS:  Good bilateral air exchange.

ABDOMEN:  Soft, nontender.

EXTREMITIES:  No edema.  No calf swelling.  No tenderness.  No acute

ischemia.

CENTRAL NERVOUS SYSTEM:  Essentially unchanged.

FACIAL EXAM:  The lower lip is still significantly swollen, but does look a

little less than yesterday.



DIAGNOSTIC DATA:  Available diagnostic data reviewed.  WBC count is 15. 

Culture is still pending.



ASSESSMENT AND PLAN:  Overall, the patient is clinically seems to be

improving.  Plan as ordered.



__________________________________________

Vipul Teixeira MD





DD:  12/31/2017 9:19:36

DT:  12/31/2017 9:46:39

Job # [id number]

## 2017-12-31 NOTE — CP.PCM.PN
Subjective





- Date & Time of Evaluation


Date of Evaluation: 12/31/17


Time of Evaluation: 09:00





- Subjective


Subjective: 





swelling and pain less


no fever


no drainage


lips have scabbed over 








Objective





- Vital Signs/Intake and Output


Vital Signs (last 24 hours): 


 











Temp Pulse Resp BP Pulse Ox


 


 98.5 F   71   18   130/79   96 


 


 12/31/17 08:22  12/31/17 08:22  12/31/17 08:22  12/31/17 08:22  12/31/17 08:22











- Medications


Medications: 


 Current Medications





Acetaminophen (Tylenol 325mg Tab)  650 mg PO Q6 PRN


   PRN Reason: Pain, moderate (4-7)


Acetaminophen (Tylenol 325mg Tab)  650 mg PO Q6 PRN


   PRN Reason: Headache


Acyclovir (Zovirax)  800 mg PO 5XD ANGELA


   PRN Reason: Protocol


   Last Admin: 12/31/17 12:23 Dose:  800 mg


Enoxaparin Sodium (Lovenox)  40 mg SC DAILY ANGELA


   PRN Reason: Protocol


   Last Admin: 12/31/17 09:45 Dose:  Not Given


Vancomycin HCl 1,500 mg/ (Sodium Chloride)  500 mls @ 250 mls/hr IVPB Q12 ANGELA


   PRN Reason: Protocol


   Last Admin: 12/31/17 09:37 Dose:  250 mls/hr


Piperacillin Sod/Tazobactam (Sod 3.375 gm/ Sodium Chloride)  100 mls @ 100 mls/

hr IVPB 0000,0600,1200,1800 ANGELA


   PRN Reason: Protocol


   Last Admin: 12/31/17 11:51 Dose:  100 mls/hr


Lactobacillus Acidophilus (Bacid Acidophilus)  1 cap PO BID ANGELA


Mupirocin (Bactroban Ointment)  1 applic TOP BID ANGELA


   Last Admin: 12/31/17 12:21 Dose:  1 unit


Tramadol HCl (Ultram)  50 mg PO Q6 PRN


   PRN Reason: Pain, severe (8-10)


   Last Admin: 12/29/17 03:14 Dose:  50 mg


Vitamin A (Vitamin A & D Oint Ud Foilpak)  1 ea TOP PRN PRN


   PRN Reason: Dry mouth


   Last Admin: 12/29/17 09:16 Dose:  1 ea











- Labs


Labs: 


 





 12/31/17 05:30 





 12/31/17 05:30 





 











PT  14.1 Seconds (9.8-13.1)  H  12/29/17  16:41    


 


INR  1.3  (0.9-1.2)  H  12/29/17  16:41    


 


APTT  35.2 Seconds (25.6-37.1)   12/29/17  16:41    














- Constitutional


Appears: Non-toxic, Chronically Ill





- Head Exam


Head Exam: NORMOCEPHALIC





- Eye Exam


Eye Exam: PERRL





- ENT Exam


ENT Exam: Mucous Membranes Dry





- Neck Exam


Neck Exam: absent: Lymphadenopathy





- Respiratory Exam


Respiratory Exam: Decreased Breath Sounds, Clear to Ausculation Bilateral





- Cardiovascular Exam


Cardiovascular Exam: REGULAR RHYTHM, +S1, +S2





- GI/Abdominal Exam


GI & Abdominal Exam: Distended, Soft.  absent: Tenderness





- Rectal Exam


Rectal Exam: Deferred





-  Exam


 Exam: NORMAL INSPECTION





- Extremities Exam


Extremities Exam: absent: Pedal Edema





- Back Exam


Back Exam: absent: CVA tenderness (L), CVA tenderness (R)





- Neurological Exam


Neurological Exam: Alert, Awake, Oriented x3





- Psychiatric Exam


Psychiatric exam: Normal Mood





- Skin


Skin Exam: Dry





Assessment and Plan


(1) Abscess of lip


Status: Acute   





(2) Infection of lip


Status: Acute

## 2018-01-01 LAB
BUN SERPL-MCNC: 17 MG/DL (ref 9–20)
CALCIUM SERPL-MCNC: 9.3 MG/DL (ref 8.4–10.2)
GFR NON-AFRICAN AMERICAN: 29

## 2018-01-01 RX ADMIN — Medication SCH CAP: at 17:39

## 2018-01-01 RX ADMIN — WATER SCH MLS/HR: 1 INJECTION INTRAMUSCULAR; INTRAVENOUS; SUBCUTANEOUS at 12:14

## 2018-01-01 RX ADMIN — VITAMIN A AND VITAMIN D PRN EA: 929.3 OINTMENT TOPICAL at 08:58

## 2018-01-01 RX ADMIN — WATER SCH MLS/HR: 1 INJECTION INTRAMUSCULAR; INTRAVENOUS; SUBCUTANEOUS at 05:26

## 2018-01-01 RX ADMIN — Medication SCH CAP: at 08:57

## 2018-01-01 RX ADMIN — WATER SCH MLS/HR: 1 INJECTION INTRAMUSCULAR; INTRAVENOUS; SUBCUTANEOUS at 17:39

## 2018-01-01 RX ADMIN — ENOXAPARIN SODIUM SCH: 40 INJECTION SUBCUTANEOUS at 08:57

## 2018-01-01 RX ADMIN — WATER SCH MLS/HR: 1 INJECTION INTRAMUSCULAR; INTRAVENOUS; SUBCUTANEOUS at 00:17

## 2018-01-01 NOTE — PN
DATE:  01/01/2018



SUBJECTIVE:  Patient was seen and examined.  Interim events noted. 

Consults noted and appreciated.  Infectious Disease followup and

intervention noted and appreciated.  The patient remains in regular medical

floor.  Patient is better.  Lip swelling improved.  No chest pain or

shortness of breath, although patient does have increased bowel movement.



PHYSICAL EXAMINATION:

GENERAL:  Patient is in no acute distress.

VITAL SIGNS:  Stable.

HEART:  S1 and S2, normal and regular.

LUNGS:  Good bilateral air entry.

ABDOMEN:  Soft, nontender.  No organomegaly.  No fluid.  No sign of acute

abdomen.  No guarding.  No rigidity.  No rebound.  Bowel sounds are present

and normal.

EXTREMITIES:  No edema.  No calf swelling.  No tenderness.  No acute

ischemia.

CENTRAL NERVOUS SYSTEM:  Essentially unchanged.

FACIAL EXAM:  Patient's lip is much more improved, much less swollen, had

crusting.



DIAGNOSTIC DATA:  Available diagnostic data reviewed.  WBC is down to 12,

hemoglobin 13, hematocrit 40, platelets 247.  Culture shows Staphylococcus

aureus.  Sensitivity is still pending.



ASSESSMENT AND PLAN:  Overall, patient is clinically stable and improving. 

Plan as ordered.  Case and plan discussed with patient.





__________________________________________

Vipul Teixeira MD



DD:  01/01/2018 8:56:21

DT:  01/01/2018 9:59:31

Job # [id number]

## 2018-01-02 LAB
ALBUMIN SERPL-MCNC: 3.7 G/DL (ref 3.5–5)
ALBUMIN/GLOB SERPL: 1 {RATIO} (ref 1–2.1)
ALT SERPL-CCNC: 66 U/L (ref 21–72)
AST SERPL-CCNC: 32 U/L (ref 17–59)
BUN SERPL-MCNC: 18 MG/DL (ref 9–20)
CALCIUM SERPL-MCNC: 9 MG/DL (ref 8.4–10.2)
ERYTHROCYTE [DISTWIDTH] IN BLOOD BY AUTOMATED COUNT: 13.8 % (ref 11.5–14.5)
GFR NON-AFRICAN AMERICAN: 24
HEPATITIS A IGM: NEGATIVE
HEPATITIS B SURFACE AG: NEGATIVE
HGB BLD-MCNC: 13.2 G/DL (ref 12–18)
MCH RBC QN AUTO: 26.8 PG (ref 27–31)
MCHC RBC AUTO-ENTMCNC: 33.2 G/DL (ref 33–37)
MCV RBC AUTO: 80.9 FL (ref 80–94)
PLATELET # BLD: 275 K/UL (ref 130–400)
RBC # BLD AUTO: 4.91 MIL/UL (ref 4.4–5.9)
WBC # BLD AUTO: 11.6 K/UL (ref 4.8–10.8)

## 2018-01-02 RX ADMIN — Medication SCH CAP: at 17:33

## 2018-01-02 RX ADMIN — Medication SCH CAP: at 09:13

## 2018-01-02 RX ADMIN — WATER SCH MLS/HR: 1 INJECTION INTRAMUSCULAR; INTRAVENOUS; SUBCUTANEOUS at 00:26

## 2018-01-02 RX ADMIN — WATER SCH MLS/HR: 1 INJECTION INTRAMUSCULAR; INTRAVENOUS; SUBCUTANEOUS at 05:57

## 2018-01-02 NOTE — CP.PCM.PN
Subjective





- Date & Time of Evaluation


Date of Evaluation: 01/02/18


Time of Evaluation: 08:00





- Subjective


Subjective: 





improving 


MRSA +


less swelling  no fever 





Objective





- Vital Signs/Intake and Output


Vital Signs (last 24 hours): 


 











Temp Pulse Resp BP Pulse Ox


 


 98.0 F   70   19   114/71   96 


 


 01/02/18 07:41  01/02/18 07:41  01/02/18 07:41  01/02/18 07:41  01/02/18 07:41











- Medications


Medications: 


 Current Medications





Acetaminophen (Tylenol 325mg Tab)  650 mg PO Q6 PRN


   PRN Reason: Pain, moderate (4-7)


Acetaminophen (Tylenol 325mg Tab)  650 mg PO Q6 PRN


   PRN Reason: Headache


Acyclovir (Zovirax)  800 mg PO 5XD ANGELA


   PRN Reason: Protocol


   Last Admin: 01/02/18 09:14 Dose:  800 mg


Piperacillin Sod/Tazobactam (Sod 3.375 gm/ Sodium Chloride)  100 mls @ 100 mls/

hr IVPB 0000,0600,1200,1800 ANGELA


   PRN Reason: Protocol


   Last Admin: 01/02/18 05:57 Dose:  100 mls/hr


Vancomycin HCl 1 gm/ Sodium (Chloride)  250 mls @ 166.667 mls/hr IVPB Q12 ANGELA


   PRN Reason: Protocol


   Last Admin: 01/02/18 09:16 Dose:  Not Given


Lactobacillus Acidophilus (Bacid Acidophilus)  1 cap PO BID ANGELA


   Last Admin: 01/02/18 09:13 Dose:  1 cap


Linezolid (Zyvox)  600 mg PO Q12 ANGELA


   PRN Reason: Protocol


Mupirocin (Bactroban Ointment)  1 applic TOP BID [facility]


   Last Admin: 01/02/18 09:13 Dose:  1 unit


Vitamin A (Vitamin A & D Oint Ud Foilpak)  1 ea TOP PRN PRN


   PRN Reason: Dry mouth


   Last Admin: 01/01/18 08:58 Dose:  1 ea











- Labs


Labs: 


 





 01/02/18 09:52 





 01/02/18 09:52 





 











PT  14.1 Seconds (9.8-13.1)  H  12/29/17  16:41    


 


INR  1.3  (0.9-1.2)  H  12/29/17  16:41    


 


APTT  35.2 Seconds (25.6-37.1)   12/29/17  16:41    














- Constitutional


Appears: Non-toxic, Chronically Ill





- Head Exam


Head Exam: NORMOCEPHALIC





- Eye Exam


Eye Exam: PERRL





- ENT Exam


ENT Exam: Mucous Membranes Dry





- Neck Exam


Neck Exam: absent: Lymphadenopathy





- Respiratory Exam


Respiratory Exam: Decreased Breath Sounds





- Cardiovascular Exam


Cardiovascular Exam: REGULAR RHYTHM, +S1, +S2





- GI/Abdominal Exam


GI & Abdominal Exam: Distended, Soft





- Rectal Exam


Rectal Exam: Deferred





-  Exam


 Exam: NORMAL INSPECTION





- Extremities Exam


Extremities Exam: absent: Pedal Edema





- Back Exam


Back Exam: absent: CVA tenderness (L), CVA tenderness (R)





- Neurological Exam


Neurological Exam: Alert, Awake, Oriented x3





- Psychiatric Exam


Psychiatric exam: Normal Mood





- Skin


Skin Exam: Dry





Assessment and Plan


(1) Abscess of lip


Status: Acute   





(2) Infection of lip


Status: Acute   





- Assessment and Plan (Free Text)


Assessment: 





cont po zvox for 5 days 


follow up PMD

## 2018-01-02 NOTE — CP.PCM.PN
Subjective





- Date & Time of Evaluation


Date of Evaluation: 01/02/18


Time of Evaluation: 07:00





- Subjective


Subjective: 





vanco held yestersay as lecvel was high


creat level increasing


stat renal consult recommended


vanco d/c'd


po zyvox started 








Objective





- Vital Signs/Intake and Output


Vital Signs (last 24 hours): 


 











Temp Pulse Resp BP Pulse Ox


 


 98.0 F   70   19   114/71   96 


 


 01/02/18 07:41  01/02/18 07:41  01/02/18 07:41  01/02/18 07:41  01/02/18 07:41











- Medications


Medications: 


 Current Medications





Acetaminophen (Tylenol 325mg Tab)  650 mg PO Q6 PRN


   PRN Reason: Pain, moderate (4-7)


Acetaminophen (Tylenol 325mg Tab)  650 mg PO Q6 PRN


   PRN Reason: Headache


Lactobacillus Acidophilus (Bacid Acidophilus)  1 cap PO BID [facility]


   Last Admin: 01/02/18 09:13 Dose:  1 cap


Linezolid (Zyvox)  600 mg PO Q12 ANGELA


   PRN Reason: Protocol


Mupirocin (Bactroban Ointment)  1 applic TOP BID [facility]


   Last Admin: 01/02/18 09:13 Dose:  1 unit


Vitamin A (Vitamin A & D Oint Ud Foilpak)  1 ea TOP PRN PRN


   PRN Reason: Dry mouth


   Last Admin: 01/01/18 08:58 Dose:  1 ea











- Labs


Labs: 


 





 01/02/18 09:52 





 01/02/18 09:52 





 











PT  14.1 Seconds (9.8-13.1)  H  12/29/17  16:41    


 


INR  1.3  (0.9-1.2)  H  12/29/17  16:41    


 


APTT  35.2 Seconds (25.6-37.1)   12/29/17  16:41    














Assessment and Plan


(1) Abscess of lip


Status: Acute   





(2) Infection of lip


Status: Acute

## 2018-01-02 NOTE — CP.PCM.PN
<Loly Root - Last Filed: 01/02/18 17:14>





Subjective





- Date & Time of Evaluation


Date of Evaluation: 01/02/18


Time of Evaluation: 07:30





- Subjective


Subjective: 


Patient seen and examined at bedside with Dr. Teixeira. Patient is tolerating 

antibiotic therapy and liquid diet. No complaints at this time.








Objective





- Vital Signs/Intake and Output


Vital Signs (last 24 hours): 


 











Temp Pulse Resp BP Pulse Ox


 


 98.2 F   82   20   111/68   98 


 


 01/02/18 15:42  01/02/18 15:42  01/02/18 15:42  01/02/18 15:42  01/02/18 15:42











- Medications


Medications: 


 Current Medications





Acetaminophen (Tylenol 325mg Tab)  650 mg PO Q6 PRN


   PRN Reason: Pain, moderate (4-7)


Acetaminophen (Tylenol 325mg Tab)  650 mg PO Q6 PRN


   PRN Reason: Headache


Sodium Chloride (Sodium Chloride 0.45%)  1,000 mls @ 100 mls/hr IV .Q10H [facility]


   Stop: 01/03/18 13:01


   Last Admin: 01/02/18 14:08 Dose:  100 mls/hr


Lactobacillus Acidophilus (Bacid Acidophilus)  1 cap PO BID [facility]


   Last Admin: 01/02/18 09:13 Dose:  1 cap


Linezolid (Zyvox)  600 mg PO Q12 ANGELA


   PRN Reason: Protocol


   Last Admin: 01/02/18 14:08 Dose:  600 mg


Mupirocin (Bactroban Ointment)  1 applic TOP BID [facility]


   Last Admin: 01/02/18 09:13 Dose:  1 unit


Vitamin A (Vitamin A & D Oint Ud Foilpak)  1 ea TOP PRN PRN


   PRN Reason: Dry mouth


   Last Admin: 01/01/18 08:58 Dose:  1 ea











- Labs


Labs: 


 





 01/02/18 09:52 





 01/02/18 09:52 





 











PT  14.1 Seconds (9.8-13.1)  H  12/29/17  16:41    


 


INR  1.3  (0.9-1.2)  H  12/29/17  16:41    


 


APTT  35.2 Seconds (25.6-37.1)   12/29/17  16:41    














- Constitutional


Appears: No Acute Distress (obese)





- Head Exam


Head Exam: ATRAUMATIC, NORMOCEPHALIC





- Eye Exam


Eye Exam: EOMI, PERRL





- ENT Exam


ENT Exam: Mucous Membranes Dry


Additional comments: 





lower lip with significant swelling, dried up blood





- Neck Exam


Neck Exam: Full ROM.  absent: Lymphadenopathy





- Respiratory Exam


Respiratory Exam: Clear to Ausculation Bilateral, NORMAL BREATHING PATTERN





- Cardiovascular Exam


Cardiovascular Exam: REGULAR RHYTHM, +S1, +S2





- GI/Abdominal Exam


GI & Abdominal Exam: Soft (obese), Normal Bowel Sounds





- Extremities Exam


Extremities Exam: Full ROM.  absent: Pedal Edema





- Neurological Exam


Neurological Exam: Alert, Awake, Oriented x3





- Psychiatric Exam


Psychiatric exam: Normal Affect, Normal Mood





- Skin


Skin Exam: Dry, Warm





Assessment and Plan





- Assessment and Plan (Free Text)


Assessment: 


Assessment/Plan





1. Abscess of lip


-s/p I & D and vanco (d/c'd 12/31/17; zosyn d/c'd 1/2/17)


-Plastic surgery consult.Dr. Lewis, plastic surgeon consult appreciated


-ID consult appreciated: start Zyvox PO x 5 days





2. Acute kidney injury


-s/p vancomycin, d'c'd yesterday


-1L NS IV at 100mls/hr


-f/u repeat Creatinine





3. Transaminitis


-resolved, today AST/ALT enzymes wnl


-12/29/17: Abd US: Cholelithiasis


-Lipid profile wnl, Hgba1c 5.5


-Hepatic panel negative


-HIV negative





4. DVT Prophylaxis


-renally dosed: Lovenox 30 mg SC QD











<Teixeira,Viplu K - Last Filed: 01/09/18 12:27>





Objective





- Vital Signs/Intake and Output


Vital Signs (last 24 hours): 


 











Temp Pulse Resp BP Pulse Ox


 


 97.5 F L  77   18   129/81   96 


 


 01/05/18 08:25  01/05/18 08:25  01/05/18 08:25  01/05/18 08:25  01/05/18 08:25











- Labs


Labs: 


 





 01/05/18 05:10 





 01/05/18 11:45 





 











PT  14.1 Seconds (9.8-13.1)  H  12/29/17  16:41    


 


INR  1.3  (0.9-1.2)  H  12/29/17  16:41    


 


APTT  35.2 Seconds (25.6-37.1)   12/29/17  16:41    














Assessment and Plan





- Assessment and Plan (Free Text)


Assessment: 


Patient was personally seen and examined by me in rounds with residents.


Available labs and diagnostic data reviewed.


Case, Patient's condition and management plan discussed with residents in 

rounds.


Agree with resident's progress note.


Plan: As ordered.

## 2018-01-03 LAB
ALBUMIN SERPL-MCNC: 3.6 G/DL (ref 3.5–5)
ALBUMIN/GLOB SERPL: 0.9 {RATIO} (ref 1–2.1)
ALT SERPL-CCNC: 58 U/L (ref 21–72)
AST SERPL-CCNC: 52 U/L (ref 17–59)
BUN SERPL-MCNC: 20 MG/DL (ref 9–20)
CALCIUM SERPL-MCNC: 8.6 MG/DL (ref 8.4–10.2)
COLLECT DURATION TIME UR: 24 HRS
CREAT UR-MCNC: 37 MG/DL
ERYTHROCYTE [DISTWIDTH] IN BLOOD BY AUTOMATED COUNT: 13.5 % (ref 11.5–14.5)
GFR NON-AFRICAN AMERICAN: 25
HGB BLD-MCNC: 12.7 G/DL (ref 12–18)
MCH RBC QN AUTO: 26.9 PG (ref 27–31)
MCHC RBC AUTO-ENTMCNC: 32.9 G/DL (ref 33–37)
MCV RBC AUTO: 81.6 FL (ref 80–94)
PLATELET # BLD: 248 K/UL (ref 130–400)
RBC # BLD AUTO: 4.73 MIL/UL (ref 4.4–5.9)
U CREAT 24HOUR URINE: 1776 MG/24HR (ref 800–2800)
URINE TOTAL VOLUME: 4800 ML
WBC # BLD AUTO: 14.1 K/UL (ref 4.8–10.8)

## 2018-01-03 RX ADMIN — Medication SCH CAP: at 10:29

## 2018-01-03 RX ADMIN — Medication SCH CAP: at 17:10

## 2018-01-03 NOTE — CP.PCM.CON
History of Present Illness





- History of Present Illness


History of Present Illness: 





pt is seen and examined, full consult is dictated #[id number]


1> non oliguric Hilda r/o AIn sec to vanco


2. lower lip MRsa infection s/p I & d


 c/w ic abx, check urine for eosinophils





Past Patient History





- Past Social History


Smoking Status: Never Smoked





- CARDIAC


Hx Cardiac Disorders: No





- PULMONARY


Hx Respiratory Disorders: No





- NEUROLOGICAL


Hx Neurological Disorder: No





- HEENT


Hx HEENT Problems: No





- RENAL


Hx Chronic Kidney Disease: No





- ENDOCRINE/METABOLIC


Hx Endocrine Disorders: No





- HEMATOLOGICAL/ONCOLOGICAL


Hx Blood Disorders: No





- INTEGUMENTARY


Hx Dermatological Problems: No





- MUSCULOSKELETAL/RHEUMATOLOGICAL


Hx Musculoskeletal Disorders: No


Hx Falls: No





- GENITOURINARY/GYNECOLOGICAL


Hx Genitourinary Disorders: No





- PSYCHIATRIC


Hx Psychophysiologic Disorder: No


Hx Substance Use: No





- SURGICAL HISTORY


Hx Surgeries: No





- ANESTHESIA


Hx Anesthesia: No





Meds


Allergies/Adverse Reactions: 


 Allergies











Allergy/AdvReac Type Severity Reaction Status Date / Time


 


naproxen [From Naprosyn] AdvReac   Verified 12/26/17 08:09














- Medications


Medications: 


 Current Medications





Acetaminophen (Tylenol 325mg Tab)  650 mg PO Q6 PRN


   PRN Reason: Pain, moderate (4-7)


Acetaminophen (Tylenol 325mg Tab)  650 mg PO Q6 PRN


   PRN Reason: Headache


Lactobacillus Acidophilus (Bacid Acidophilus)  1 cap PO BID [facility]


   Last Admin: 01/03/18 17:10 Dose:  1 cap


Linezolid (Zyvox)  600 mg PO Q12 ANGELA


   PRN Reason: Protocol


   Last Admin: 01/03/18 10:29 Dose:  600 mg


Mupirocin (Bactroban Ointment)  1 applic TOP BID [facility]


   Last Admin: 01/03/18 17:11 Dose:  1 unit


Vitamin A (Vitamin A & D Oint Ud Foilpak)  1 ea TOP PRN PRN


   PRN Reason: Dry mouth


   Last Admin: 01/01/18 08:58 Dose:  1 ea











Results





- Vital Signs


Recent Vital Signs: 


 Last Vital Signs











Temp  98.6 F   01/03/18 16:09


 


Pulse  83   01/03/18 16:09


 


Resp  18   01/03/18 16:09


 


BP  128/72   01/03/18 16:09


 


Pulse Ox  97   01/03/18 16:09














- Labs


Result Diagrams: 


 01/03/18 05:45





 01/03/18 05:45


Labs: 


 Laboratory Results - last 24 hr











  01/03/18 01/03/18 01/03/18





  05:45 05:45 19:00


 


WBC  14.1 H  


 


RBC  4.73  


 


Hgb  12.7  


 


Hct  38.6  


 


MCV  81.6  


 


MCH  26.9 L  


 


MCHC  32.9 L  


 


RDW  13.5  


 


Plt Count  248  


 


Sodium   140 


 


Potassium   4.6 


 


Chloride   107 


 


Carbon Dioxide   22 


 


Anion Gap   16 


 


BUN   20 


 


Creatinine   2.8 H 


 


Est GFR ( Amer)   31 


 


Est GFR (Non-Af Amer)   25 


 


Random Glucose   87 


 


Calcium   8.6 


 


Total Bilirubin   0.6 


 


AST   52 


 


ALT   58 


 


Alkaline Phosphatase   92 


 


Total Protein   7.5 


 


Albumin   3.6 


 


Globulin   3.9 


 


Albumin/Globulin Ratio   0.9 L 


 


Urine Collection Time    24


 


Urine Total Volume    4800


 


Ur Creatinine 24 Hour    1776.0

## 2018-01-03 NOTE — CP.PCM.PN
<Loly Root - Last Filed: 01/03/18 11:13>





Subjective





- Date & Time of Evaluation


Date of Evaluation: 01/03/18


Time of Evaluation: 07:05





- Subjective


Subjective: 


Patient seen and examined at bedside with attending-Dr. Teixeira. Patient laying in 

bed in no acute distress, reports he is tolerating solid food. Denies fevers, 

chills, SOB, weakness, dysuria or hematuria. No concerns at this time. 








Objective





- Vital Signs/Intake and Output


Vital Signs (last 24 hours): 


 











Temp Pulse Resp BP Pulse Ox


 


 98.1 F   78   20   133/81   97 


 


 01/03/18 08:30  01/03/18 08:30  01/03/18 08:30  01/03/18 08:30  01/03/18 08:30











- Medications


Medications: 


 Current Medications





Acetaminophen (Tylenol 325mg Tab)  650 mg PO Q6 PRN


   PRN Reason: Pain, moderate (4-7)


Acetaminophen (Tylenol 325mg Tab)  650 mg PO Q6 PRN


   PRN Reason: Headache


Sodium Chloride (Sodium Chloride 0.45%)  1,000 mls @ 100 mls/hr IV .Q10H [facility]


   Stop: 01/03/18 13:01


   Last Admin: 01/03/18 10:30 Dose:  100 mls/hr


Lactobacillus Acidophilus (Bacid Acidophilus)  1 cap PO BID [facility]


   Last Admin: 01/03/18 10:29 Dose:  1 cap


Linezolid (Zyvox)  600 mg PO Q12 ANGELA


   PRN Reason: Protocol


   Last Admin: 01/03/18 10:29 Dose:  600 mg


Mupirocin (Bactroban Ointment)  1 applic TOP BID [facility]


   Last Admin: 01/03/18 10:29 Dose:  1 unit


Vitamin A (Vitamin A & D Oint Ud Foilpak)  1 ea TOP PRN PRN


   PRN Reason: Dry mouth


   Last Admin: 01/01/18 08:58 Dose:  1 ea











- Labs


Labs: 


 





 01/03/18 05:45 





 01/03/18 05:45 





 











PT  14.1 Seconds (9.8-13.1)  H  12/29/17  16:41    


 


INR  1.3  (0.9-1.2)  H  12/29/17  16:41    


 


APTT  35.2 Seconds (25.6-37.1)   12/29/17  16:41    














- Constitutional


Appears: No Acute Distress





- Head Exam


Head Exam: ATRAUMATIC, NORMOCEPHALIC





- Eye Exam


Eye Exam: EOMI, PERRL





- ENT Exam


ENT Exam: Mucous Membranes Moist


Additional comments: 


mildly improved swelling of lower lip, no discharge, no foul odor








- Neck Exam


Neck Exam: Full ROM





- Respiratory Exam


Respiratory Exam: Clear to Ausculation Bilateral, NORMAL BREATHING PATTERN





- Cardiovascular Exam


Cardiovascular Exam: REGULAR RHYTHM, +S1, +S2





- Extremities Exam


Extremities Exam: Full ROM.  absent: Pedal Edema





- Neurological Exam


Neurological Exam: Alert, Awake, CN II-XII Intact, Oriented x3





- Psychiatric Exam


Psychiatric exam: Normal Affect, Normal Mood





- Skin


Skin Exam: Dry, Warm





Assessment and Plan





- Assessment and Plan (Free Text)


Assessment: 


1. Abscess of lip


-s/p I & D and vanco (d/c'd 12/31/17; zosyn d/c'd 1/2/17)


-Plastic surgery consult.Dr. Lewis, plastic surgeon consult appreciated


-ID consult appreciated: Day 2/5 Zyvox PO x 5 days





2. Acute kidney injury


-s/p vancomycin, d'c'd 1/1/18


-1L NS IV at 100mls/hr


-repeat Creatinine this AM stable at 2.8


-Nephrology consult appreciated-Dr. Phelps





3. DVT Prophylaxis


-OLIVIER stockings and SCD's











<Vipul Teixeira K - Last Filed: 01/09/18 12:36>





Objective





- Vital Signs/Intake and Output


Vital Signs (last 24 hours): 


 











Temp Pulse Resp BP Pulse Ox


 


 97.5 F L  77   18   129/81   96 


 


 01/05/18 08:25  01/05/18 08:25  01/05/18 08:25  01/05/18 08:25  01/05/18 08:25











- Labs


Labs: 


 





 01/05/18 05:10 





 01/05/18 11:45 





 











PT  14.1 Seconds (9.8-13.1)  H  12/29/17  16:41    


 


INR  1.3  (0.9-1.2)  H  12/29/17  16:41    


 


APTT  35.2 Seconds (25.6-37.1)   12/29/17  16:41    














Assessment and Plan





- Assessment and Plan (Free Text)


Assessment: 


Patient was personally seen and examined by me in rounds with residents.


Available labs and diagnostic data reviewed.


Case, Patient's condition and management plan discussed with residents in 

rounds.


Agree with resident's progress note.


Plan: As ordered.

## 2018-01-03 NOTE — CP.PCM.PN
Subjective





- Date & Time of Evaluation


Date of Evaluation: 01/03/18


Time of Evaluation: 09:00





- Subjective


Subjective: 





renal function stable but altered


discussed with patient 


cont iv fluids





Objective





- Vital Signs/Intake and Output


Vital Signs (last 24 hours): 


 











Temp Pulse Resp BP Pulse Ox


 


 98.1 F   78   20   133/81   97 


 


 01/03/18 08:30  01/03/18 08:30  01/03/18 08:30  01/03/18 08:30  01/03/18 08:30











- Medications


Medications: 


 Current Medications





Acetaminophen (Tylenol 325mg Tab)  650 mg PO Q6 PRN


   PRN Reason: Pain, moderate (4-7)


Acetaminophen (Tylenol 325mg Tab)  650 mg PO Q6 PRN


   PRN Reason: Headache


Sodium Chloride (Sodium Chloride 0.45%)  1,000 mls @ 100 mls/hr IV .Q10H [facility]


   Stop: 01/03/18 13:01


   Last Admin: 01/03/18 10:30 Dose:  100 mls/hr


Lactobacillus Acidophilus (Bacid Acidophilus)  1 cap PO BID [facility]


   Last Admin: 01/03/18 10:29 Dose:  1 cap


Linezolid (Zyvox)  600 mg PO Q12 ANG[facility]A


   PRN Reason: Protocol


   Last Admin: 01/03/18 10:29 Dose:  600 mg


Mupirocin (Bactroban Ointment)  1 applic TOP BID [facility]


   Last Admin: 01/03/18 10:29 Dose:  1 unit


Vitamin A (Vitamin A & D Oint Ud Foilpak)  1 ea TOP PRN PRN


   PRN Reason: Dry mouth


   Last Admin: 01/01/18 08:58 Dose:  1 ea











- Labs


Labs: 


 





 01/03/18 05:45 





 01/03/18 05:45 





 











PT  14.1 Seconds (9.8-13.1)  H  12/29/17  16:41    


 


INR  1.3  (0.9-1.2)  H  12/29/17  16:41    


 


APTT  35.2 Seconds (25.6-37.1)   12/29/17  16:41    














- Constitutional


Appears: Non-toxic, Chronically Ill





- Head Exam


Head Exam: NORMOCEPHALIC





- Eye Exam


Eye Exam: PERRL





- ENT Exam


ENT Exam: Mucous Membranes Dry, Normal Oropharynx





- Neck Exam


Neck Exam: absent: Lymphadenopathy





- Respiratory Exam


Respiratory Exam: Decreased Breath Sounds





- Cardiovascular Exam


Cardiovascular Exam: REGULAR RHYTHM





- GI/Abdominal Exam


GI & Abdominal Exam: Distended, Soft





- Rectal Exam


Rectal Exam: Deferred





-  Exam


 Exam: NORMAL INSPECTION





- Extremities Exam


Extremities Exam: absent: Pedal Edema





- Back Exam


Back Exam: absent: CVA tenderness (L), CVA tenderness (R)





- Neurological Exam


Neurological Exam: Alert, Awake, Oriented x3





Assessment and Plan


(1) Abscess of lip


Status: Acute   





(2) Infection of lip


Status: Acute   





- Assessment and Plan (Free Text)


Assessment: 





cont hydration


vanco d/c'd


renal eval  suggested

## 2018-01-04 LAB
ALBUMIN MFR UR ELPH: 54.9 RELATIVE %
ALPHA-1 GLOBULIN: 7.5 RELATIVE %
BACTERIA #/AREA URNS HPF: (no result) /[HPF]
BILIRUB UR-MCNC: NEGATIVE MG/DL
BUN SERPL-MCNC: 21 MG/DL (ref 9–20)
CALCIUM SERPL-MCNC: 8.9 MG/DL (ref 8.4–10.2)
COLOR UR: (no result)
CREAT 24H UR-MRATE: (no result) G/24 H (ref 0.63–2.5)
ERYTHROCYTE [DISTWIDTH] IN BLOOD BY AUTOMATED COUNT: 13.9 % (ref 11.5–14.5)
GFR NON-AFRICAN AMERICAN: 27
GLUCOSE UR STRIP-MCNC: (no result) MG/DL
HGB BLD-MCNC: 12.5 G/DL (ref 12–18)
LEUKOCYTE ESTERASE UR-ACNC: (no result) LEU/UL
MCH RBC QN AUTO: 26.7 PG (ref 27–31)
MCHC RBC AUTO-ENTMCNC: 32.7 G/DL (ref 33–37)
MCV RBC AUTO: 81.6 FL (ref 80–94)
PH UR STRIP: 6 [PH] (ref 5–8)
PLATELET # BLD: 227 K/UL (ref 130–400)
PROT UR STRIP-MCNC: NEGATIVE MG/DL
RBC # BLD AUTO: 4.67 MIL/UL (ref 4.4–5.9)
RBC # UR STRIP: (no result) /UL
SP GR UR STRIP: < 1.005 (ref 1–1.03)
SQUAMOUS EPITHIAL: < 1 /HPF (ref 0–5)
URINE CLARITY: CLEAR
URINE NITRATE: NEGATIVE
UROBILINOGEN UR-MCNC: (no result) MG/DL (ref 0.2–1)
WBC # BLD AUTO: 14.5 K/UL (ref 4.8–10.8)

## 2018-01-04 RX ADMIN — Medication SCH CAP: at 16:33

## 2018-01-04 RX ADMIN — Medication SCH CAP: at 08:36

## 2018-01-04 NOTE — CP.PCM.PN
<Loly Root - Last Filed: 01/04/18 17:11>





Subjective





- Date & Time of Evaluation


Date of Evaluation: 01/04/18


Time of Evaluation: 08:00





- Subjective


Subjective: 


Patient seen and examined at bedside with attending Dr. Teixeira. Patient denies SOB

, weakness, dizziness or chest pain. Is tolerating PO diet. No concerns or 

complaints at this time.  








Objective





- Vital Signs/Intake and Output


Vital Signs (last 24 hours): 


 











Temp Pulse Resp BP Pulse Ox


 


 98.0 F   75   20   116/75   97 


 


 01/04/18 08:00  01/04/18 08:00  01/04/18 08:00  01/04/18 08:00  01/04/18 08:00











- Medications


Medications: 


 Current Medications





Acetaminophen (Tylenol 325mg Tab)  650 mg PO Q6 PRN


   PRN Reason: Pain, moderate (4-7)


Acetaminophen (Tylenol 325mg Tab)  650 mg PO Q6 PRN


   PRN Reason: Headache


Acetaminophen (Tylenol 325mg Tab)  650 mg PO Q6 PRN


   PRN Reason: Fever >100.4 F


   Last Admin: 01/04/18 00:31 Dose:  650 mg


Sodium Chloride (Sodium Chloride 0.9%)  1,000 mls @ 125 mls/hr IV .Q8H [facility]


   Stop: 01/05/18 12:09


Lactobacillus Acidophilus (Bacid Acidophilus)  1 cap PO BID [facility]


   Last Admin: 01/04/18 08:36 Dose:  1 cap


Linezolid (Zyvox)  600 mg PO Q12 [facility]


   PRN Reason: Protocol


   Last Admin: 01/04/18 08:36 Dose:  600 mg


Mupirocin (Bactroban Ointment)  1 applic TOP BID [facility]


   Last Admin: 01/04/18 08:36 Dose:  1 unit


Vitamin A (Vitamin A & D Oint Ud Foilpak)  1 ea TOP PRN PRN


   PRN Reason: Dry mouth


   Last Admin: 01/01/18 08:58 Dose:  1 ea











- Labs


Labs: 


 





 01/04/18 05:35 





 01/04/18 05:35 





 











PT  14.1 Seconds (9.8-13.1)  H  12/29/17  16:41    


 


INR  1.3  (0.9-1.2)  H  12/29/17  16:41    


 


APTT  35.2 Seconds (25.6-37.1)   12/29/17  16:41    














- Constitutional


Appears: No Acute Distress





- Head Exam


Head Exam: ATRAUMATIC, NORMOCEPHALIC





- Eye Exam


Eye Exam: EOMI





- ENT Exam


ENT Exam: Mucous Membranes Moist


Additional comments: 





lower lip swelling improved further, dry scab over area of prior I&D, no 

discharge or malodor





- Neck Exam


Neck Exam: Full ROM





- Respiratory Exam


Respiratory Exam: Clear to Ausculation Bilateral, NORMAL BREATHING PATTERN





- Cardiovascular Exam


Cardiovascular Exam: REGULAR RHYTHM, +S1, +S2





- Extremities Exam


Extremities Exam: Full ROM.  absent: Pedal Edema





- Neurological Exam


Neurological Exam: Alert, Awake, CN II-XII Intact, Oriented x3





- Psychiatric Exam


Psychiatric exam: Normal Affect, Normal Mood





- Skin


Skin Exam: Dry, Warm





Assessment and Plan





- Assessment and Plan (Free Text)


Assessment: 


1. Abscess of lip


-s/p I & D and vanco (d/c'd 12/31/17; zosyn d/c'd 1/2/17)


-Plastic surgery consult.Dr. Lewis, plastic surgeon consult appreciated


-ID consult appreciated: Day 3/5 Zyvox PO x 5 days





2. Acute kidney injury


-s/p vancomycin, d'c'd 1/1/18


-1L NS IV at 125mls/hr


-repeat Creatinine this AM stable at 2.8


-Nephrology consult appreciated-Dr. Phelps: non oliguric JAIME, check urine 

for eosinophils


-f/u urine culture and BMP 





3. DVT Prophylaxis


-OLIVIER stockings and SCD's








<Vipul Teixeira K - Last Filed: 01/09/18 12:51>





Objective





- Vital Signs/Intake and Output


Vital Signs (last 24 hours): 


 











Temp Pulse Resp BP Pulse Ox


 


 97.5 F L  77   18   129/81   96 


 


 01/05/18 08:25  01/05/18 08:25  01/05/18 08:25  01/05/18 08:25  01/05/18 08:25











- Labs


Labs: 


 





 01/05/18 05:10 





 01/05/18 11:45 





 











PT  14.1 Seconds (9.8-13.1)  H  12/29/17  16:41    


 


INR  1.3  (0.9-1.2)  H  12/29/17  16:41    


 


APTT  35.2 Seconds (25.6-37.1)   12/29/17  16:41    














Assessment and Plan





- Assessment and Plan (Free Text)


Assessment: 


Patient was personally seen and examined by me in rounds with residents.


Available labs and diagnostic data reviewed.


Case, Patient's condition and management plan discussed with residents in 

rounds.


Agree with resident's progress note.


Plan: As ordered.

## 2018-01-05 VITALS
OXYGEN SATURATION: 96 % | SYSTOLIC BLOOD PRESSURE: 129 MMHG | RESPIRATION RATE: 18 BRPM | HEART RATE: 77 BPM | DIASTOLIC BLOOD PRESSURE: 81 MMHG | TEMPERATURE: 97.5 F

## 2018-01-05 LAB
BUN SERPL-MCNC: 22 MG/DL (ref 9–20)
CALCIUM SERPL-MCNC: 8.7 MG/DL (ref 8.4–10.2)
ERYTHROCYTE [DISTWIDTH] IN BLOOD BY AUTOMATED COUNT: 13.8 % (ref 11.5–14.5)
GFR NON-AFRICAN AMERICAN: 27
GFR NON-AFRICAN AMERICAN: 28
HGB BLD-MCNC: 11.9 G/DL (ref 12–18)
MCH RBC QN AUTO: 26.3 PG (ref 27–31)
MCHC RBC AUTO-ENTMCNC: 32.5 G/DL (ref 33–37)
MCV RBC AUTO: 80.8 FL (ref 80–94)
OSMOLALITY,URINE: 215 MOSM/KG (ref 300–1000)
PLATELET # BLD: 240 K/UL (ref 130–400)
RBC # BLD AUTO: 4.52 MIL/UL (ref 4.4–5.9)
WBC # BLD AUTO: 15.8 K/UL (ref 4.8–10.8)

## 2018-01-05 RX ADMIN — Medication SCH CAP: at 08:25

## 2018-01-05 NOTE — CP.PCM.PN
Subjective





- Date & Time of Evaluation


Date of Evaluation: 01/05/18


Time of Evaluation: 07:25





Objective





- Vital Signs/Intake and Output


Vital Signs (last 24 hours): 


 











Temp Pulse Resp BP Pulse Ox


 


 98.3 F   82   19   128/78   97 


 


 01/05/18 00:29  01/05/18 00:29  01/05/18 00:29  01/05/18 00:29  01/05/18 00:29











- Medications


Medications: 


 Current Medications





Acetaminophen (Tylenol 325mg Tab)  650 mg PO Q6 PRN


   PRN Reason: Pain, moderate (4-7)


Acetaminophen (Tylenol 325mg Tab)  650 mg PO Q6 PRN


   PRN Reason: Headache


Acetaminophen (Tylenol 325mg Tab)  650 mg PO Q6 PRN


   PRN Reason: Fever >100.4 F


   Last Admin: 01/04/18 00:31 Dose:  650 mg


Sodium Chloride (Sodium Chloride 0.9%)  1,000 mls @ 125 mls/hr IV .Q8H [facility]


   Stop: 01/05/18 12:09


   Last Admin: 01/05/18 04:38 Dose:  Not Given


Sodium Chloride (Sodium Chloride 0.9%)  1,000 mls @ 125 mls/hr IV .Q8H [facility]


   Stop: 01/06/18 07:52


Lactobacillus Acidophilus (Bacid Acidophilus)  1 cap PO BID [facility]


   Last Admin: 01/04/18 16:33 Dose:  1 cap


Linezolid (Zyvox)  600 mg PO Q12 [facility]A


   PRN Reason: Protocol


   Last Admin: 01/04/18 20:50 Dose:  600 mg


Mupirocin (Bactroban Ointment)  1 applic TOP BID [facility]


   Last Admin: 01/04/18 16:33 Dose:  1 unit


Vitamin A (Vitamin A & D Oint Ud Foilpak)  1 ea TOP PRN PRN


   PRN Reason: Dry mouth


   Last Admin: 01/01/18 08:58 Dose:  1 ea











- Labs


Labs: 


 





 01/05/18 05:10 





 01/05/18 05:10 





 











PT  14.1 Seconds (9.8-13.1)  H  12/29/17  16:41    


 


INR  1.3  (0.9-1.2)  H  12/29/17  16:41    


 


APTT  35.2 Seconds (25.6-37.1)   12/29/17  16:41

## 2018-01-05 NOTE — RAD
HISTORY:

Leukocytosis, r/o pneumonia  



COMPARISON:

No prior.



TECHNIQUE:

Chest PA and lateral



FINDINGS:



LUNGS:

No active pulmonary disease.



PLEURA:

No significant pleural effusion identified. No pneumothorax apparent.



CARDIOVASCULAR:

Upper limits normal cardiac size.  No pulmonary vascular derangement 

identified.



OSSEOUS STRUCTURES:

No significant abnormalities.



VISUALIZED UPPER ABDOMEN:

Normal.



OTHER FINDINGS:

None.



IMPRESSION:

Upper limits normal cardiac size. No pulmonary derangement 

identified. No acute infiltrate, pleural effusion or pneumothorax 

bilaterally.

## 2018-01-05 NOTE — CP.PCM.DIS
Provider





- Provider


Date of Admission: 


12/29/17 09:15





Attending physician: 


Vipul Teixeira MD





Primary care physician: 


Dr. Teixeira





Consults: 


ID- Dr. Bourgeois, Nephrology-Dr. Phelps





Time Spent in preparation of Discharge (in minutes): 30





Diagnosis





- Discharge Diagnosis


(1) Abscess of lip


Status: Resolved   Priority: Low   





Hospital Course





- Lab Results


Lab Results: 


 Micro Results





01/04/18 12:45   Blood   Blood Culture - Preliminary


                            NO GROWTH AFTER 24 HOURS


01/03/18 23:34   Urine   Urine Culture - Final


                            No Growth (<1,000 CFU/ML)


12/28/17 13:54   Blood-Venous   Blood Culture - Final


                            NO GROWTH AFTER 5 DAYS


12/28/17 13:54   Blood-Venous   Gram Stain - Final


                            TEST NOT PERFORMED


12/28/17 13:36   Blood-Venous   Blood Culture - Final


                            NO GROWTH AFTER 5 DAYS


12/28/17 13:36   Blood-Venous   Gram Stain - Final


                            TEST NOT PERFORMED


12/28/17 12:04   Lip   Gram Stain - Final


12/28/17 12:04   Lip   Wound Culture - Final


                            Methicillin Resistant S Aureus





 Most Recent Lab Values











WBC  15.8 K/uL (4.8-10.8)  H  01/05/18  05:10    


 


RBC  4.52 Mil/uL (4.40-5.90)   01/05/18  05:10    


 


Hgb  11.9 g/dL (12.0-18.0)  L  01/05/18  05:10    


 


Hct  36.5 % (35.0-51.0)   01/05/18  05:10    


 


MCV  80.8 fl (80.0-94.0)   01/05/18  05:10    


 


MCH  26.3 pg (27.0-31.0)  L  01/05/18  05:10    


 


MCHC  32.5 g/dL (33.0-37.0)  L  01/05/18  05:10    


 


RDW  13.8 % (11.5-14.5)   01/05/18  05:10    


 


Plt Count  240 K/uL (130-400)   01/05/18  05:10    


 


MPV  8.6 fl (7.2-11.7)   12/31/17  14:15    


 


Neut % (Auto)  71.5 % (50.0-75.0)   12/31/17  14:15    


 


Lymph % (Auto)  16.8 % (20.0-40.0)  L  12/31/17  14:15    


 


Mono % (Auto)  9.4 % (0.0-10.0)   12/31/17  14:15    


 


Eos % (Auto)  1.9 % (0.0-4.0)   12/31/17  14:15    


 


Baso % (Auto)  0.4 % (0.0-2.0)   12/31/17  14:15    


 


Neut #  8.7 K/uL (1.8-7.0)  H  12/31/17  14:15    


 


Lymph #  2.0 K/uL (1.0-4.3)   12/31/17  14:15    


 


Mono #  1.1 K/uL (0.0-0.8)  H  12/31/17  14:15    


 


Eos #  0.2 K/uL (0.0-0.7)   12/31/17  14:15    


 


Baso #  0.0 K/uL (0.0-0.2)   12/31/17  14:15    


 


PT  14.1 Seconds (9.8-13.1)  H  12/29/17  16:41    


 


INR  1.3  (0.9-1.2)  H  12/29/17  16:41    


 


APTT  35.2 Seconds (25.6-37.1)   12/29/17  16:41    


 


pO2  19 mm/Hg (30-55)  L  12/28/17  12:37    


 


VBG pH  7.39  (7.32-7.43)   12/28/17  12:37    


 


VBG pCO2  54 mmHg (40-60)   12/28/17  12:37    


 


VBG HCO3  27.6 mmol/L  12/28/17  12:37    


 


VBG Total CO2  34.4 mmol/L (22-28)  H  12/28/17  12:37    


 


VBG O2 Sat (Calc)  37.4 % (40-65)  L  12/28/17  12:37    


 


VBG Base Excess  5.9 mmol/L (0.0-2.0)  H  12/28/17  12:37    


 


VBG Potassium  4.4 mmol/L (3.6-5.2)   12/28/17  12:37    


 


A-a O2 Difference  63.0 mm/Hg  12/28/17  12:37    


 


Sodium  136.0 mmol/L (132-148)   12/28/17  12:37    


 


Chloride  102.0 mmol/L ()   12/28/17  12:37    


 


Glucose  103 mg/dL ()   12/28/17  12:37    


 


Lactate  1.1 mmol/L (0.7-2.1)   12/28/17  12:37    


 


FiO2  21.0 %  12/28/17  12:37    


 


Crit Value Called To  Susanne marshall   12/28/17  12:37    


 


Crit Value Called By  15   12/28/17  12:37    


 


Crit Value Read Back  Y   12/28/17  12:37    


 


Blood Gas Notified Time  1327   12/28/17  12:37    


 


Sodium  142 mmol/l (132-148)   01/05/18  05:10    


 


Potassium  4.0 MMOL/L (3.6-5.0)   01/05/18  05:10    


 


Chloride  110 mmol/L ()  H  01/05/18  05:10    


 


Carbon Dioxide  23 mmol/L (22-30)   01/05/18  05:10    


 


Anion Gap  13  (10-20)   01/05/18  05:10    


 


BUN  22 mg/dl (9-20)  H  01/05/18  05:10    


 


Creatinine  2.6 mg/dl (0.8-1.5)  H  01/05/18  11:45    


 


Est GFR ( Amer)  34   01/05/18  11:45    


 


Est GFR (Non-Af Amer)  28   01/05/18  11:45    


 


Random Glucose  92 mg/dL ()   01/05/18  05:10    


 


Hemoglobin A1c  5.5 % (4.2-6.5)   12/29/17  12:07    


 


Calcium  8.7 mg/dL (8.4-10.2)   01/05/18  05:10    


 


Total Bilirubin  0.6 mg/dl (0.2-1.3)   01/03/18  05:45    


 


AST  52 U/L (17-59)   01/03/18  05:45    


 


ALT  58 U/L (21-72)   01/03/18  05:45    


 


Alkaline Phosphatase  92 U/L ()   01/03/18  05:45    


 


Total Protein  7.5 G/DL (6.3-8.2)   01/03/18  05:45    


 


Albumin  3.6 g/dL (3.5-5.0)   01/03/18  05:45    


 


Globulin  3.9 gm/dL (2.2-3.9)   01/03/18  05:45    


 


Albumin/Globulin Ratio  0.9  (1.0-2.1)  L  01/03/18  05:45    


 


Alpha-1-Globulins  7.5 Relative %  01/02/18  19:00    


 


Alpha-2-Globulins  13.3 Relative %  01/02/18  19:00    


 


Beta Globulins  10.5 Relative %  01/02/18  19:00    


 


Gamma Globulins  13.7 Relative %  01/02/18  19:00    


 


Triglycerides  71 mg/DL (0-149)   12/30/17  05:20    


 


Cholesterol  152 mg/dL (0-199)   12/30/17  05:20    


 


LDL Cholesterol Direct  99 mg/dL (0-129)   12/30/17  05:20    


 


HDL Cholesterol  31 MG/DL (30-70)   12/30/17  05:20    


 


Venous Blood Potassium  4.4 mmol/L (3.6-5.2)   12/28/17  12:37    


 


Urine Color  Straw  (YELLOW)   01/04/18  00:01    


 


Urine Clarity  Clear  (Clear)   01/04/18  00:01    


 


Urine pH  6.0  (5.0-8.0)   01/04/18  00:01    


 


Ur Specific Gravity  < 1.005  (1.003-1.030)   01/04/18  00:01    


 


Urine Protein  Negative mg/dL (NEGATIVE)   01/04/18  00:01    


 


Urine Glucose (UA)  Neg mg/dL (Normal)   01/04/18  00:01    


 


Urine Ketones  Negative mg/dL (NEGATIVE)   01/04/18  00:01    


 


Urine Blood  Small  (NEGATIVE)   01/04/18  00:01    


 


Urine Nitrate  Negative  (NEGATIVE)   01/04/18  00:01    


 


Urine Bilirubin  Negative  (NEGATIVE)   01/04/18  00:01    


 


Urine Urobilinogen  0.2-1.0 mg/dL (0.2-1.0)   01/04/18  00:01    


 


Ur Leukocyte Esterase  Neg Mario/uL (Negative)   01/04/18  00:01    


 


Urine RBC (Auto)  3 /hpf (0-3)   01/04/18  00:01    


 


Urine Microscopic WBC  1 /hpf (0-5)   01/04/18  00:01    


 


Ur Squamous Epith Cells  < 1 /hpf (0-5)   01/04/18  00:01    


 


Urine Bacteria  Rare  (<OCC)   01/04/18  00:01    


 


Urine Eosinophils  Negative  (NEGATIVE)   01/04/18  00:00    


 


Urine Osmolality  215 mosm/kg (300-1000)  L  01/05/18  13:00    


 


Ur Random Sodium  65 meq/L  01/05/18  13:00    


 


Ur Random Potassium  9.6 mmol/L  01/05/18  13:00    


 


Urine Collection Time  24 HRS  01/03/18  19:00    


 


Urine Total Volume  4800 mL  01/03/18  19:00    


 


Urine Creatinine  0.44 g/L  01/02/18  19:00    


 


Ur Creatinine 24 Hour  1776.0 mg/24hr (800-2800)   01/03/18  19:00    


 


Ur Total Protein 24 Hr  See note mg/24 h (<150)   01/02/18  19:00    


 


Protein/Creat Ratio 24h  168 mg/g creat (</=84)  H  01/02/18  19:00    


 


Urine Total Protein  74 mg/L ()   01/02/18  19:00    


 


Urine Albumin (PEP)  54.9 Relative %  01/02/18  19:00    


 


Ur Protein Fractions  See note   01/02/18  19:00    


 


Vancomycin Trough  30.7 ug/mL (5.0-10.0)  H  01/01/18  06:30    


 


Random Vancomycin  15.3 ug/mL  01/01/18  20:30    


 


Hepatitis A IgM Ab  Negative  (NEGATIVE)   12/30/17  05:20    


 


Hep Bs Antigen  Negative  (NEGATIVE)   12/30/17  05:20    


 


Hep B Core IgM Ab  Negative  (NEGATIVE)   12/30/17  05:20    


 


Hepatitis C Antibody  Negative  (NEGATIVE)   12/30/17  05:20    


 


HIV 1&2 Ag/Ab, 4th Gen  Nonreactive  (Nonreactive)   12/29/17  16:41    


 


HIV 1&2 Antibody Screen  Negative  (NEGATIVE)   12/29/17  16:02    














- Hospital Course


Hospital Course: 


37 yr old M admitted for severe lip abscess whish s/p I&D by plastic surgery 

and IV antibiotic treatment. Patient developed JAIME and was seen by nephrology. 

Cleared for discharge with instructions to finish course of PO antibiotics and 

follow up closely with nephrology, PMD, plastic surgery and ID within 1 week. 








- Date & Time of H&P


Date of H&P: 12/29/17


Time of H&P: 08:28





Discharge Exam





- Head Exam


Head Exam: NORMOCEPHALIC





- Eye Exam


Eye Exam: EOMI





- ENT Exam


ENT Exam: Mucous Membranes Moist


Additional comments: 





lip swelling minimal, significantly improved, no discharge or malodor from I&D 

site





- Neck Exam


Neck exam: Full Rom





- Respiratory Exam


Respiratory Exam: Clear to PA & Lateral, NORMAL BREATHING PATTERN





- Cardiovascular Exam


Cardiovascular Exam: REGULAR RHYTHM, +S1, +S2





- GI/Abdominal Exam


GI & Abdominal Exam: Normal Bowel Sounds, Soft (obese)





- Extremities Exam


Extremities exam: full ROM





- Neurological Exam


Neurological exam: Alert, CN II-XII Intact, Normal Gait, Oriented x3





- Psychiatric Exam


Psychiatric exam: Normal Affect, Normal Mood





- Skin


Skin Exam: Dry, Normal Color, Warm





Discharge Plan





- Discharge Medications


Prescriptions: 


Linezolid [Zyvox] 600 mg PO Q12 #5 tab





- Follow Up Plan


Condition: STABLE


Disposition: HOME/ ROUTINE


Instructions:  Abscess (GEN)


Additional Instructions: 


Complete all antibiotics. Follow up with Dr. Teixeira on Monday and with Dr. Phelps on Wednesday of next week


Referrals: 


Vipul Teixeira MD [Staff Provider] - 


Raphael Phelps MD [Staff Provider] - 


Johnny Bourgeois MD [Staff Provider] - 


Kym Lewis MD [Medical Doctor] - 





Clinical Quality Measures





- Date & Time of Discharge Summary


Date of Discharge Summary: 01/05/18


Time of Discharge Summary: 15:24

## 2018-01-05 NOTE — PN
DATE:



FOLLOWUP RENAL CONSULTATION



LOCATION:  Patient is located in room 662, bed 2.



REQUESTED BY: Vipul Teixeira MD.



REASON FOR FOLLOWUP:  Acute renal failure, for further evaluation.



HISTORY OF PRESENT ILLNESS:  Mr. Ordoñez is a 37-year-old, very pleasant,

obese male with no significant past medical history, who was admitted with

lower lip abscess status post I and D, status post treatment with acyclovir

and also status post treatment with IV vancomycin and Zosyn.  Patient was

found to have a normal creatinine on 12/28/2017 with 0.7 and patient was

found to have with worsening serum creatinine on 12/31/2017 at 1.3 and on

01/01/2018, creatinine was 2.5; on 01/02/2018, creatinine went up to 2.9;

and as of on 01/03/2018, creatinine went down slightly to 2.8; and as of

01/05/2018, creatinine is 2.6.  The patient denies any complaints.  Less

pain in the lower lip and swelling is improving.  No fever, no cough, no

chest pain, no palpitation, no abdominal pain.  No nausea, vomiting, or

diarrhea.



PHYSICAL EXAMINATION:

VITAL SIGNS:  As follows:  Blood pressure 129/81, pulse 77, respirations

18, temperature 97.5, saturation 96%.  Height 5 feet 6 inches and weight is

230 pounds, BMI of 37.1.

GENERAL:  Mr. Ordoñez is a 37-year-old obese male, well built, well

nourished, not in distress.

HEENT:  Pupils are normal and reactive to light and accommodation. 

Conjunctivae pink.  Sclerae are anicteric.  Tongue is moist, less swelling

of the lower lip with small crust.  Trachea is midline.

LUNGS:  Symmetric on both sides.  Bilateral breath sounds are present. 

Clear on auscultation.

CARDIOVASCULAR SYSTEM:  [place] at the fifth intercostal space, half-inch

medial to midclavicular line.  S1 and S2 audible.  No murmur.  No gallop.

ABDOMEN:  Normal in appearance.  Soft, tympanic.  No guarding.  No

rigidity.  No hepatosplenomegaly.

CENTRAL NERVOUS SYSTEM:  Patient is alert, awake, and oriented x3. 

Nonfocal neuro examination .  Cranial nerves II through XII grossly intact.

Sensory and motor system is within normal limits.

EXTREMITIES:  No cyanosis.  No clubbing.  No edema.



CURRENT MEDICATIONS:  Include as follows:  Tramadol and Zyvox.



LABORATORY DATA:  His laboratory data as of 01/04/2018, WBC 14.5,

hemoglobin 12.5, hematocrit 38.1, platelets 227.  Sodium 141, potassium

4.8, chloride 108, CO2 of 24, BUN 21, creatinine 2.7, glucose 87, calcium

8.9.  As of 01/05/2018, WBC 15.8, hemoglobin 11.9, hematocrit is 36.5,

platelets 240.  Sodium 140, potassium is 4, chloride 110, CO2 of 23, BUN

22, creatinine 2.7, glucose 97, calcium 8.7 and repeat creatinine is 2.6 as

of 01/05/2018 at 11:45.  Urine osmolality is 215, urine sodium is 65, urine

potassium is 9.6, and urine eosinophils were negative as of 01/04/2018.



ASSESSMENT AND PLAN:  In summary, Mr. Ordoñez is 37-year-old, obese,

 male with no significant past medical history, who was admitted

with lower lip abscess status post incision and drainage and also on IV

antibiotic, acyclovir, vancomycin, and Zosyn, now on Zyvox, with increased

BUN and creatinine as of 01/01/2018, creatinine went up to 2.9, now it is

slowly coming down with creatinine 2.6 today.

1.  Nonoliguric acute renal failure, most likely secondary to acute tubular

necrosis, cannot rule out acute interstitial nephritis, even though

eosinophils were negative.

2.  Status post incision and drainage of lower lip abscess and cellulitis. 

Continue Zyvox as per ID recommendations.



Patient is stable from the renal standpoint.  Patient can be discharged

home from the renal standpoint and I advised to repeat BMP on Monday,

follow up in the office on Wednesday.  We will follow with you.



Thank you for allowing me to participate in your patient's care.  Case

discussed with nurse practitioner, Roberta, in rounds.





__________________________________________

Raphael Phelps MD



DD:  01/05/2018 17:51:04

DT:  01/05/2018 20:21:27

Job # [id number]

## 2018-01-05 NOTE — CP.PCM.PN
Subjective





- Date & Time of Evaluation


Date of Evaluation: 01/05/18


Time of Evaluation: 09:00





- Subjective


Subjective: 





renal function improving


cleared by renal


lip is better





Objective





- Vital Signs/Intake and Output


Vital Signs (last 24 hours): 


 











Temp Pulse Resp BP Pulse Ox


 


 97.5 F L  77   18   129/81   96 


 


 01/05/18 08:25  01/05/18 08:25  01/05/18 08:25  01/05/18 08:25  01/05/18 08:25











- Medications


Medications: 


 Current Medications





Acetaminophen (Tylenol 325mg Tab)  650 mg PO Q6 PRN


   PRN Reason: Pain, moderate (4-7)


Acetaminophen (Tylenol 325mg Tab)  650 mg PO Q6 PRN


   PRN Reason: Headache


Acetaminophen (Tylenol 325mg Tab)  650 mg PO Q6 PRN


   PRN Reason: Fever >100.4 F


   Last Admin: 01/04/18 00:31 Dose:  650 mg


Sodium Chloride (Sodium Chloride 0.9%)  1,000 mls @ 125 mls/hr IV .Q8H [facility]


   Stop: 01/06/18 07:52


   Last Admin: 01/05/18 08:14 Dose:  125 mls/hr


Lactobacillus Acidophilus (Bacid Acidophilus)  1 cap PO BID [facility]


   Last Admin: 01/05/18 08:25 Dose:  1 cap


Linezolid (Zyvox)  600 mg PO Q12 ANGELA


   PRN Reason: Protocol


   Last Admin: 01/05/18 08:25 Dose:  600 mg


Mupirocin (Bactroban Ointment)  1 applic TOP BID [facility]


   Last Admin: 01/05/18 08:26 Dose:  1 unit


Vitamin A (Vitamin A & D Oint Ud Foilpak)  1 ea TOP PRN PRN


   PRN Reason: Dry mouth


   Last Admin: 01/01/18 08:58 Dose:  1 ea











- Labs


Labs: 


 





 01/05/18 05:10 





 01/05/18 05:10 





 











PT  14.1 Seconds (9.8-13.1)  H  12/29/17  16:41    


 


INR  1.3  (0.9-1.2)  H  12/29/17  16:41    


 


APTT  35.2 Seconds (25.6-37.1)   12/29/17  16:41    














- Constitutional


Appears: Non-toxic





- Head Exam


Head Exam: NORMOCEPHALIC





- Eye Exam


Eye Exam: absent: Scleral icterus





- ENT Exam


ENT Exam: Normal External Ear Exam





- Neck Exam


Neck Exam: absent: Lymphadenopathy





- Respiratory Exam


Respiratory Exam: Decreased Breath Sounds, Clear to Ausculation Bilateral





- Cardiovascular Exam


Cardiovascular Exam: REGULAR RHYTHM, +S1, +S2





- GI/Abdominal Exam


GI & Abdominal Exam: Distended, Soft





- Rectal Exam


Rectal Exam: Deferred





-  Exam


 Exam: NORMAL INSPECTION





- Back Exam


Back Exam: absent: CVA tenderness (L), CVA tenderness (R)





- Neurological Exam


Neurological Exam: Alert, Awake, Oriented x3





Assessment and Plan


(1) Abscess of lip


Status: Acute   





(2) Infection of lip


Status: Acute   





- Assessment and Plan (Free Text)


Plan: 





d/c home on PO rx


follow up Dr Burroughs

## 2018-01-05 NOTE — CP.PCM.PN
Subjective





- Date & Time of Evaluation


Date of Evaluation: 01/05/18


Time of Evaluation: 10:50





- Subjective


Subjective: 





pt is seen and examined, follow up consult is dictated #[id number]





Objective





- Vital Signs/Intake and Output


Vital Signs (last 24 hours): 


 











Temp Pulse Resp BP Pulse Ox


 


 97.5 F L  77   18   129/81   96 


 


 01/05/18 08:25  01/05/18 08:25  01/05/18 08:25  01/05/18 08:25  01/05/18 08:25











- Medications


Medications: 


 Current Medications





Acetaminophen (Tylenol 325mg Tab)  650 mg PO Q6 PRN


   PRN Reason: Pain, moderate (4-7)


Acetaminophen (Tylenol 325mg Tab)  650 mg PO Q6 PRN


   PRN Reason: Headache


Acetaminophen (Tylenol 325mg Tab)  650 mg PO Q6 PRN


   PRN Reason: Fever >100.4 F


   Last Admin: 01/04/18 00:31 Dose:  650 mg


Sodium Chloride (Sodium Chloride 0.9%)  1,000 mls @ 125 mls/hr IV .Q8H [facility]


   Stop: 01/05/18 12:09


   Last Admin: 01/05/18 04:38 Dose:  Not Given


Sodium Chloride (Sodium Chloride 0.9%)  1,000 mls @ 125 mls/hr IV .Q8H [facility]


   Stop: 01/06/18 07:52


   Last Admin: 01/05/18 08:14 Dose:  125 mls/hr


Lactobacillus Acidophilus (Bacid Acidophilus)  1 cap PO BID [facility]


   Last Admin: 01/05/18 08:25 Dose:  1 cap


Linezolid (Zyvox)  600 mg PO Q12 ANGELA


   PRN Reason: Protocol


   Last Admin: 01/05/18 08:25 Dose:  600 mg


Mupirocin (Bactroban Ointment)  1 applic TOP BID [facility]


   Last Admin: 01/05/18 08:26 Dose:  1 unit


Vitamin A (Vitamin A & D Oint Ud Foilpak)  1 ea TOP PRN PRN


   PRN Reason: Dry mouth


   Last Admin: 01/01/18 08:58 Dose:  1 ea











- Labs


Labs: 


 





 01/05/18 05:10 





 01/05/18 05:10 





 











PT  14.1 Seconds (9.8-13.1)  H  12/29/17  16:41    


 


INR  1.3  (0.9-1.2)  H  12/29/17  16:41    


 


APTT  35.2 Seconds (25.6-37.1)   12/29/17  16:41

## 2018-01-11 NOTE — OP
PROCEDURE DATE:  12/28/17



SURGEON:  Kym Lewis MD.



PREOPERATIVE DIAGNOSES:  Right lower lip abscess and cellulitis.



POSTOPERATIVE DIAGNOSES:  Right lower lip abscess and cellulitis.



PROCEDURES PERFORMED:  As follows:

1.  Incision and drainage of a deep, complicated right lower lip abscess.

2.  Debridement of lip mucosa.

3.  Right mental nerve block.



ANESTHESIA:  Regional.



INDICATION FOR PROCEDURE:  As follows:  Please refer to my separately

dictated ER consultation for history and physical.



DESCRIPTION OF PROCEDURE:  As follows:  A 1% lidocaine was used in a right

mental nerve block for regional anesthesia.  After allowing sufficient time

for the anesthetic to take effect, the wound was pepped and draped in the

usual clean and sterile manner.  With a #15 blade, I made a small incision

and then I used a clamp and I explored the wound, 10 mL of pus came out in

the submucosal plane.  Some of the mucosa was devitalized and nonviable and

necrotic and had some swelling.  I debrided this with scissor technique. 

After performing a full decompression and a complicated incision and

drainage and debridement and exploration, I sent wound cultures and left a

quarter-inch packing in the wound and then dry gauze.  Patient tolerated

the procedure well.  Wound cultures were sent to the lab.  Patient was

continued and admitted for IV antibiotics and was going to be transitioned

to oral antibiotics.  I explained to the patient and the medical team to

remove the packing in 2 to 3 days and to then initiate local wound care

with soap and water, squeezing _____ topical cream and they need to follow

up with me in a few days.  Warned the patient of a possibility of

recurrence and need for further operations.





__________________________________________

Kym Lewis MD



DD:  01/10/2018 19:18:56

DT:  01/10/2018 22:54:34

Job # [id number]

## 2018-01-14 NOTE — CON
DATE:



SURGEON:  Kym Lewis MD



HISTORY OF PRESENT ILLNESS:  This is a 37-year-old male with no significant

past medical history, who presented to the emergency room with a 2-day

history of right lower lip swelling, pain, drainage and an abscess.  The ER

staff consulted me for this facial abscess that was extremely large, thus I

was called in as the plastic surgeon on-call.  I came in to evaluate and

treat the patient.  On history, the patient states 2 days ago, he had like

maybe a pimple of his lip which he may have picked or a cold sore and then

it has gotten increasingly red and swollen and painful.



PHYSICAL EXAMINATION:  Right lower lip, the entire half of it was extremely

red, swollen and warm and there was a little bit of drainage but there was

a fluctuant tender area, and there was no intraoral communication or any

other abscesses that I could see on the face.  He was neurovascularly

intact.  I told him after a regional nerve block, I would do an incision

and drainage, explore and debride some of the mucosa and then pack it and

send wound cultures.  The patient was to be admitted for IV antibiotics

until the wound cultures come back and then transition to oral antibiotics.



Risk and benefits were fully discussed including the fact that there was

going to be a scar and the possibility of further procedures.  I will now

dictate a separate operative report.





__________________________________________

Kym Lewis MD



DD:  01/10/2018 19:16:46

DT:  01/10/2018 22:53:10

Job # [id number]
DATE:  01/03/2018



RENAL CONSULTATION



LOCATION:  Room 662, Bed 2



REQUESTING PHYSICIAN:  Vipul Teixeira MD.



REASON FOR CONSULTATION:  Acute renal failure, for further evaluation.



HISTORY OF PRESENT ILLNESS:  Mr. Ordoñez is a 37-year-old obese male with

no significant past medical history, presented to the emergency room with

chief complaints of lower lip swelling and abscess.  Patient reported

initially that 6 years ago he pressed a pimple over his inferior lower lip

and 3 days after it got swollen and painful.  The patient came to the

emergency room initially for further evaluation and was prescribed

clindamycin, but 2 days after treatment, it got worse with more swelling

associated with chills and subjective fever.  Status post I and D by

Plastic Surgery.  The patient was started on IV antibiotics.  Now renal

consult requested for acute kidney injury.  Patient denies any chest pain,

palpitation.  Denies any fever, cough.  Denies any abdominal pain.  Denies

any nausea, vomiting, diarrhea.  Denies any skin rash.  The patient does

complain of slight flank pain since he was taking vancomycin.  No urinary

symptoms.  No edema of the legs.  No skin rashes.



PAST MEDICAL HISTORY:  Denies any high blood pressure, diabetes.



PAST SURGICAL HISTORY:  Denies any surgeries.



ALLERGIES:  ALLERGIC TO NAPROXEN ASSOCIATED WITH COUGH.



SOCIAL HISTORY:  He denies any smoking.  Denies any drug abuse.  Patient

does complain of social alcohol use.



PERSONAL HISTORY:  He is single.  He has a girlfriend.



FAMILTY HISTORY:  His father is diabetic and hypertensive.  Mother is

positive for hyperlipidemia.  He has one sister.



CURRENT MEDICATIONS:  Include lactobacillus 1 capsule p.o. b.i.d.,

Bactroban ointment topical b.i.d., Tylenol, Zyvox 600 mg p.o. q.12 hours. 

Vancomycin was discontinued.



REVIEW OF SYSTEMS:  Significant for lower lip swelling and pain, status

post I an D of the lower lip.  All other review of systems are reviewed and

are negative.



PHYSICAL EXAMINATION:

VITAL SIGNS:  Blood pressure 128/72, pulse 83, respiration 18, temperature

98.6, saturation 97%.  Height 5 feet 6 inches.   Weight is 230 pounds.  BMI

37.1.

GENERAL:  Mr. Ordoñez is a 37-year-old middle-aged obese male, moderately

built, moderately nourished, not in any distress.

HEENT:  Pupils are normal and reactive to light and accommodation. 

Conjunctivae pink.  Sclerae are anicteric.  Tongue is moist.  Trachea is

midline.  Patient has tongue swelling and crust on the lower lip.  No

thyroid enlargement.

LUNGS:  Symmetric on both sides.  Bilateral breath sounds are present. 

Clear on auscultation.

CVS:  Ava at the fifth intercostal space, 1/2 inch medial to midclavicular

line.  S1 and S2 audible.  No murmur.  No gallop.

ABDOMEN:  Normal in appearance.  Soft, tympanic.  No guarding.  No

rigidity.  No hepatosplenomegaly.

CNS:  The patient is alert, awake, and oriented x3.  Nonfocal neuro

examination .  Cranial nerves II through XII grossly intact.  Sensory and

motor system is within normal limits.

EXTREMITIES:  No cyanosis.  No clubbing.  No edema.



LABORATORY DATA:  Includes; as of 12/18/2017, WBC 16.9, hemoglobin 14.5,

hematocrit is 44.1 and platelets 243.  Sodium 139, potassium 4.3, chloride

98, CO2 of 32, BUN 6, creatinine 0.7, glucose 98, calcium 9.1.  Total

bilirubin 0.8, , , alkaline phosphatase 183, total protein

8.4, albumin is 4.4.  Hepatitis vancomycin trough level as of 01/01/2018 is

30.7 and vancomycin random level as of 01/01/2018 is 15.3.  As of

12/29/2017, BUN and creatinine are 7/0.8.  As of 12/30/2017, BUN and

creatinine of 9/0.8, cholesterol 152, LDL 99, HDL 31.  As of 12/31/2017,

BUN and creatinine are 12/1.3.  , , total protein 7.6.  As of

01/01/2018, sodium 144, potassium 4.3, chloride 106, CO2 29, BUN 17,

creatinine 2.5, glucose 97, calcium 9.3.  As of 01/02/2018, sodium 145,

potassium 4, chloride 105, CO2 26, BUN 18, creatinine 2.9, and glucose 140.

As of 01/03/2018, sodium 140, potassium 4.6, chloride 107, CO2 of 22, BUN

20, creatinine 2.8, glucose 87, calcium 8.6, total protein 0.6, AST 52, ALT

58, alkaline phosphatase 92, total protein 7.5, albumin is 3.6.  24-hour

urine volume is 4800 and 24-hour urine creatinine is 1776 mg.



ASSESSMENT AND PLAN:  In summary, Mr. Ordoñez is a 37-year-old young male

with lower lip cellulitis and abscess, status post incision and drainage

and wound culture positive for methicillin-resistant Staphylococcus aureus.

He was initially on vancomycin and subsequently vancomycin was discontinued

and started on Zyvox with increased BUN and creatinine.

1.  Nonoliguric acute renal failure, most likely secondary to antibiotics -

vancomycin; cannot rule out acute interstitial nephritis.

2.  Lower lip methicillin-resistant Staphylococcus aureus infection and

abscess, status post incision and drainage.  Continue Zyvox as per Dr. Bourgeois.  Encourage p.o fluid intake and repeat BMP in a.m. and check urine

for eosinophils.  We will follow with you.  We will avoid nephrotoxic

agents.



Thank you for allowing me to participate in your patient's care.







__________________________________________

Raphael Phepls MD





DD:  01/03/2018 21:11:44

DT:  01/03/2018 23:57:03

Job # [id number]
- - -